# Patient Record
Sex: FEMALE | Employment: FULL TIME | URBAN - METROPOLITAN AREA
[De-identification: names, ages, dates, MRNs, and addresses within clinical notes are randomized per-mention and may not be internally consistent; named-entity substitution may affect disease eponyms.]

---

## 2017-03-14 DIAGNOSIS — K21.00 GASTROESOPHAGEAL REFLUX DISEASE WITH ESOPHAGITIS: Primary | ICD-10-CM

## 2017-03-14 DIAGNOSIS — R10.13 ABDOMINAL PAIN, EPIGASTRIC: ICD-10-CM

## 2017-03-14 NOTE — TELEPHONE ENCOUNTER
LANsoprazole (PREVACID) 30 MG CR capsule      Last Written Prescription Date: 12/13/16  Last Fill Quantity: 60,  # refills: 3   Last Office Visit with [facility], [facility] or [facility] prescribing provider: 12/06/16      [facility]

## 2017-03-17 RX ORDER — LANSOPRAZOLE 30 MG/1
30 CAPSULE, DELAYED RELEASE ORAL 2 TIMES DAILY
Qty: 60 CAPSULE | Refills: 3 | Status: SHIPPED | OUTPATIENT
Start: 2017-03-17 | End: 2017-12-22

## 2017-03-17 NOTE — TELEPHONE ENCOUNTER
Routing refill request to provider for review/approval because:  Drug not on the [facility] refill protocol with associated diagnosis  Drea Olvera RN

## 2017-12-05 ENCOUNTER — TELEPHONE (OUTPATIENT)
Dept: FAMILY MEDICINE | Facility: CLINIC | Age: 42
End: 2017-12-05

## 2017-12-05 NOTE — TELEPHONE ENCOUNTER
Panel Management Review        Patient is due/failing the following:   PAP and PHYSICAL    Action needed:   Patient needs office visit for Physical.    Type of outreach:    I called pt and made an appt for physical.    Questions for provider review:    None  ANMOL Jane

## 2017-12-22 ENCOUNTER — OFFICE VISIT (OUTPATIENT)
Dept: FAMILY MEDICINE | Facility: CLINIC | Age: 42
End: 2017-12-22
Payer: COMMERCIAL

## 2017-12-22 VITALS
BODY MASS INDEX: 34.07 KG/M2 | SYSTOLIC BLOOD PRESSURE: 134 MMHG | HEART RATE: 87 BPM | TEMPERATURE: 97.3 F | WEIGHT: 212 LBS | DIASTOLIC BLOOD PRESSURE: 80 MMHG | OXYGEN SATURATION: 100 % | HEIGHT: 66 IN

## 2017-12-22 DIAGNOSIS — E55.9 VITAMIN D INSUFFICIENCY: ICD-10-CM

## 2017-12-22 DIAGNOSIS — E05.90 HYPERTHYROIDISM: ICD-10-CM

## 2017-12-22 DIAGNOSIS — Z12.39 BREAST CANCER SCREENING: ICD-10-CM

## 2017-12-22 DIAGNOSIS — Z00.00 ROUTINE GENERAL MEDICAL EXAMINATION AT A HEALTH CARE FACILITY: Primary | ICD-10-CM

## 2017-12-22 DIAGNOSIS — Z12.4 SCREENING FOR MALIGNANT NEOPLASM OF CERVIX: ICD-10-CM

## 2017-12-22 LAB
CHOLEST SERPL-MCNC: 172 MG/DL
DEPRECATED CALCIDIOL+CALCIFEROL SERPL-MC: 18 UG/L (ref 20–75)
GLUCOSE SERPL-MCNC: 108 MG/DL (ref 70–99)
HDLC SERPL-MCNC: 74 MG/DL
LDLC SERPL CALC-MCNC: 79 MG/DL
NONHDLC SERPL-MCNC: 98 MG/DL
T4 FREE SERPL-MCNC: 1.23 NG/DL (ref 0.76–1.46)
TRIGL SERPL-MCNC: 95 MG/DL
TSH SERPL DL<=0.005 MIU/L-ACNC: 0.27 MU/L (ref 0.4–4)

## 2017-12-22 PROCEDURE — 87624 HPV HI-RISK TYP POOLED RSLT: CPT | Performed by: PREVENTIVE MEDICINE

## 2017-12-22 PROCEDURE — 80061 LIPID PANEL: CPT | Performed by: PREVENTIVE MEDICINE

## 2017-12-22 PROCEDURE — 36415 COLL VENOUS BLD VENIPUNCTURE: CPT | Performed by: PREVENTIVE MEDICINE

## 2017-12-22 PROCEDURE — 84439 ASSAY OF FREE THYROXINE: CPT | Performed by: PREVENTIVE MEDICINE

## 2017-12-22 PROCEDURE — 99396 PREV VISIT EST AGE 40-64: CPT | Performed by: PREVENTIVE MEDICINE

## 2017-12-22 PROCEDURE — G0145 SCR C/V CYTO,THINLAYER,RESCR: HCPCS | Performed by: PREVENTIVE MEDICINE

## 2017-12-22 PROCEDURE — 84443 ASSAY THYROID STIM HORMONE: CPT | Performed by: PREVENTIVE MEDICINE

## 2017-12-22 PROCEDURE — 82947 ASSAY GLUCOSE BLOOD QUANT: CPT | Performed by: PREVENTIVE MEDICINE

## 2017-12-22 PROCEDURE — 82306 VITAMIN D 25 HYDROXY: CPT | Performed by: PREVENTIVE MEDICINE

## 2017-12-22 ASSESSMENT — PAIN SCALES - GENERAL: PAINLEVEL: NO PAIN (0)

## 2017-12-22 NOTE — PATIENT INSTRUCTIONS
At [facility], we strive to deliver an exceptional experience to you, every time we see you.  If you receive a survey in the mail, please send us back your thoughts. We really do value your feedback.    Based on your medical history, these are the current health maintenance/preventive care services that you are due for (some may have been done at this visit.)  Health Maintenance Due   Topic Date Due     INFLUENZA VACCINE (SYSTEM ASSIGNED)  09/01/2017     PAP Q3 YR  10/27/2017         Suggested websites for health information:  Www.Moondo.BEKIZ : Up to date and easily searchable information on multiple topics.  Www.LumiThera.gov : medication info, interactive tutorials, watch real surgeries online  Www.familydoctor.org : good info from the Academy of Family Physicians  Www.cdc.gov : public health info, travel advisories, epidemics (H1N1)  Www.aap.org : children's health info, normal development, vaccinations  Www.health.[facility].us : [place] dept of health, public health issues in [place], N1N1    Your care team:                            Family Medicine Internal Medicine   MD Javon Lombardo MD Shantel Branch-Fleming, MD Katya Georgiev PA-C Nam Ho, MD Pediatrics   Carl Venegas PABETH Chan, KHOA ULLOA CNP   MD Ashley Reyes MD Deborah Mielke, MD Kim Thein, APRN [facility]      Clinic hours: Monday - Thursday 7 am-7 pm; Fridays 7 am-5 pm.   Urgent care: Monday - Friday 11 am-9 pm; Saturday and Sunday 9 am-5 pm.  Pharmacy : Monday -Thursday 8 am-8 pm; Friday 8 am-6 pm; Saturday and Sunday 9 am-5 pm.     Clinic: [phone]   Pharmacy: [phone]      Preventive Health Recommendations  Female Ages 40 to 49    Yearly exam:     See your health care provider every year in order to  1. Review health changes.   2. Discuss preventive care.    3. Review your medicines if your doctor prescribed any.      Get a Pap test every three years (unless  you have an abnormal result and your provider advises testing more often).      If you get Pap tests with HPV test, you only need to test every 5 years, unless you have an abnormal result. You do not need a Pap test if your uterus was removed (hysterectomy) and you have not had cancer.      You should be tested each year for STDs (sexually transmitted diseases), if you're at risk.       Ask your doctor if you should have a mammogram.      Have a colonoscopy (test for colon cancer) if someone in your family has had colon cancer or polyps before age 50.       Have a cholesterol test every 5 years.       Have a diabetes test (fasting glucose) after age 45. If you are at risk for diabetes, you should have this test every 3 years.    Shots: Get a flu shot each year. Get a tetanus shot every 10 years.     Nutrition:     Eat at least 5 servings of fruits and vegetables each day.    Eat whole-grain bread, whole-wheat pasta and brown rice instead of white grains and rice.    Talk to your provider about Calcium and Vitamin D.     Lifestyle    Exercise at least 150 minutes a week (an average of 30 minutes a day, 5 days a week). This will help you control your weight and prevent disease.    Limit alcohol to one drink per day.    No smoking.     Wear sunscreen to prevent skin cancer.    See your dentist every six months for an exam and cleaning.

## 2017-12-22 NOTE — MR AVS SNAPSHOT
After Visit Summary   12/22/2017    Stuart Daily    MRN: [id number]           Patient Information     Date Of Birth          1975        Visit Information        Provider Department      12/22/2017 7:00 AM Edith Fisher MD [facility]        Today's Diagnoses     Routine general medical examination at a health care facility    -  1    Screening for malignant neoplasm of cervix        Hyperthyroidism        Vitamin D insufficiency        Breast cancer screening          Care Instructions    At [facility], we strive to deliver an exceptional experience to you, every time we see you.  If you receive a survey in the mail, please send us back your thoughts. We really do value your feedback.    Based on your medical history, these are the current health maintenance/preventive care services that you are due for (some may have been done at this visit.)  Health Maintenance Due   Topic Date Due     INFLUENZA VACCINE (SYSTEM ASSIGNED)  09/01/2017     PAP Q3 YR  10/27/2017         Suggested websites for health information:  Www.Dibsie.Evolv : Up to date and easily searchable information on multiple topics.  Www.medlineplus.gov : medication info, interactive tutorials, watch real surgeries online  Www.familydoctor.org : good info from the Academy of Family Physicians  Www.cdc.gov : public health info, travel advisories, epidemics (H1N1)  Www.aap.org : children's health info, normal development, vaccinations  Www.health.state.[place].us : [place] dept of health, public health issues in [place], N1N1    Your care team:                            Family Medicine Internal Medicine   MD Javon Lombardo MD Shantel Branch-Fleming, MD Katya Georgiev PA-C Nam Ho, MD Pediatrics   MUMTAZ Felix, KHOA Garcia APRN CNP   MD Ashley Reyes MD Deborah Mielke, MD Kim Thein, APRN CNP      Clinic hours: Monday - Thursday 7  am-7 pm; Fridays 7 am-5 pm.   Urgent care: Monday - Friday 11 am-9 pm; Saturday and Sunday 9 am-5 pm.  Pharmacy : Monday -Thursday 8 am-8 pm; Friday 8 am-6 pm; Saturday and Sunday 9 am-5 pm.     Clinic: [phone]   Pharmacy: [phone]      Preventive Health Recommendations  Female Ages 40 to 49    Yearly exam:     See your health care provider every year in order to  1. Review health changes.   2. Discuss preventive care.    3. Review your medicines if your doctor prescribed any.      Get a Pap test every three years (unless you have an abnormal result and your provider advises testing more often).      If you get Pap tests with HPV test, you only need to test every 5 years, unless you have an abnormal result. You do not need a Pap test if your uterus was removed (hysterectomy) and you have not had cancer.      You should be tested each year for STDs (sexually transmitted diseases), if you're at risk.       Ask your doctor if you should have a mammogram.      Have a colonoscopy (test for colon cancer) if someone in your family has had colon cancer or polyps before age 50.       Have a cholesterol test every 5 years.       Have a diabetes test (fasting glucose) after age 45. If you are at risk for diabetes, you should have this test every 3 years.    Shots: Get a flu shot each year. Get a tetanus shot every 10 years.     Nutrition:     Eat at least 5 servings of fruits and vegetables each day.    Eat whole-grain bread, whole-wheat pasta and brown rice instead of white grains and rice.    Talk to your provider about Calcium and Vitamin D.     Lifestyle    Exercise at least 150 minutes a week (an average of 30 minutes a day, 5 days a week). This will help you control your weight and prevent disease.    Limit alcohol to one drink per day.    No smoking.     Wear sunscreen to prevent skin cancer.    See your dentist every six months for an exam and cleaning.          Follow-ups after your visit        Future  "tests that were ordered for you today     Open Future Orders        Priority Expected Expires Ordered    *MA Screening Digital Bilateral Routine  2018            Who to contact     If you have questions or need follow up information about today's clinic visit or your schedule please contact [facility] directly at [phone].  Normal or non-critical lab and imaging results will be communicated to you by MyChart, letter or phone within 4 business days after the clinic has received the results. If you do not hear from us within 7 days, please contact the clinic through [employer] or phone. If you have a critical or abnormal lab result, we will notify you by phone as soon as possible.  Submit refill requests through [employer] or call your pharmacy and they will forward the refill request to us. Please allow 3 business days for your refill to be completed.          Additional Information About Your Visit        [employer] Information     [employer] lets you send messages to your doctor, view your test results, renew your prescriptions, schedule appointments and more. To sign up, go to [email or web address] . Click on \"Log in\" on the left side of the screen, which will take you to the Welcome page. Then click on \"Sign up Now\" on the right side of the page.     You will be asked to enter the access code listed below, as well as some personal information. Please follow the directions to create your username and password.     Your access code is: [email or web address]  Expires: 3/13/2018 10:35 AM     Your access code will  in 90 days. If you need help or a new code, please call your [facility] clinic or [phone].        Care EveryWhere ID     This is your Care EveryWhere ID. This could be used by other organizations to access your [facility] medical records  [id number]        Your Vitals Were     Pulse Temperature Height Last Period Pulse Oximetry Breastfeeding?    87 97.3  F (36.3  C) 5' 6\" " (1.676 m) (LMP Unknown) 100% No    BMI (Body Mass Index)                   34.22 kg/m2            Blood Pressure from Last 3 Encounters:   12/22/17 134/80   12/06/16 127/88   12/03/15 125/89    Weight from Last 3 Encounters:   12/22/17 212 lb (96.2 kg)   12/06/16 205 lb 1.6 oz (93 kg)   12/03/15 206 lb (93.4 kg)              We Performed the Following     Glucose     HPV High Risk Types DNA Cervical     Lipid panel reflex to direct LDL Fasting     Pap imaged thin layer screen with HPV - recommended age 30 - 65 years (select HPV order below)     T4 free     TSH     Vitamin D Deficiency          Today's Medication Changes          These changes are accurate as of: 12/22/17  7:29 AM.  If you have any questions, ask your nurse or doctor.               Stop taking these medicines if you haven't already. Please contact your care team if you have questions.     amoxicillin 500 MG capsule   Commonly known as:  AMOXIL           LANsoprazole 30 MG CR capsule   Commonly known as:  PREVACID           nitroFURantoin (macrocrystal-monohydrate) 100 MG capsule   Commonly known as:  MACROBID                    Primary Care Provider Office Phone # Fax #    Gabriel Spangler [phone]       [address] [place] [address]        Equal Access to Services     EILEEN LOCKHART AH: Hadii aad ku hadasho Soomaali, waaxda luqadaha, qaybta kaalmada adeegyada, waxay mi haymendel viera. So [facility] [phone].    ATENCIÓN: Si habla español, tiene a fontenot disposición servicios gratuitos de asistencia lingüística. Llame al [phone].    We comply with applicable federal civil rights laws and [place] laws. We do not discriminate on the basis of race, color, national origin, age, disability, sex, sexual orientation, or gender identity.            Thank you!     Thank you for choosing [facility]  for your care. Our goal is always to provide you with excellent care. Hearing back from our  patients is one way we can continue to improve our services. Please take a few minutes to complete the written survey that you may receive in the mail after your visit with us. Thank you!             Your Updated Medication List - Protect others around you: Learn how to safely use, store and throw away your medicines at www.disposemymeds.org.      Notice  As of 12/22/2017  7:29 AM    You have not been prescribed any medications.

## 2017-12-22 NOTE — LETTER
December 26, 2017      Stuart Daily  [address] [place] [address]              Dear Stuart,    Cholesterol is at goal for you.   Glucose is in the impaired range, this means you do not have diabetes but are at an increased risk of developing diabetes.     Here are some ways to improve your glucose without medication:     Try to get at least 45 minutes of aerobic exercise 5-6 days a week   Maintain a healthy body weight   Eat less saturated fats   Buy lean cuts of meat, reduce your portions of red meat or substitute poultry or fish   Avoid fried or fast foods that are high in fat   Eat more fruits and vegetables     Thyroid function is improved from last check but we should recheck this in 6 months.     Your vitamin D level was low at 18, should be over 30. Maintaining adequate levels is very important for good health. Low levels can cause fatigue and joint pains. I recommend starting over the counter Vitamin D 3 in a dose of 5000 units daily for 3 months. We will recheck labs in 3 months. Please call if you have any questions.     Regards,     Edith Fisher MD MPH/ag                        Results for orders placed or performed in visit on 12/22/17   Glucose   Result Value Ref Range    Glucose 108 (H) 70 - 99 mg/dL   Lipid panel reflex to direct LDL Fasting   Result Value Ref Range    Cholesterol 172 <200 mg/dL    Triglycerides 95 <150 mg/dL    HDL Cholesterol 74 >49 mg/dL    LDL Cholesterol Calculated 79 <100 mg/dL    Non HDL Cholesterol 98 <130 mg/dL   Vitamin D Deficiency   Result Value Ref Range    Vitamin D Deficiency screening 18 (L) 20 - 75 ug/L   T4 free   Result Value Ref Range    T4 Free 1.23 0.76 - 1.46 ng/dL   TSH   Result Value Ref Range    TSH 0.27 (L) 0.40 - 4.00 mU/L

## 2017-12-22 NOTE — PROGRESS NOTES
SUBJECTIVE:   CC: Aunelza Daily is an 42 year old woman who presents for preventive health visit.     Healthy Habits:    Do you get at least three servings of calcium containing foods daily (dairy, green leafy vegetables, etc.)? yes    Amount of exercise or daily activities, outside of work: none    Problems taking medications regularly not applicable    Medication side effects: No    Have you had an eye exam in the past two years? no    Do you see a dentist twice per year? no    Do you have sleep apnea, excessive snoring or daytime drowsiness?no      Today's PHQ-2 Score: PHQ-2 ( 1999 Pfizer) 12/22/2017 12/6/2016   Q1: Little interest or pleasure in doing things 0 0   Q2: Feeling down, depressed or hopeless 0 0   PHQ-2 Score 0 0         Abuse: Current or Past(Physical, Sexual or Emotional)- No  Do you feel safe in your environment - Yes  Social History   Substance Use Topics     Smoking status: Never Smoker     Smokeless tobacco: Never Used     Alcohol use No     If you drink alcohol do you typically have >3 drinks per day or >7 drinks per week? Not Applicable                     Reviewed orders with patient.  Reviewed health maintenance and updated orders accordingly - Yes  Labs reviewed in EPIC  BP Readings from Last 3 Encounters:   12/22/17 134/80   12/06/16 127/88   12/03/15 125/89    Wt Readings from Last 3 Encounters:   12/22/17 212 lb (96.2 kg)   12/06/16 205 lb 1.6 oz (93 kg)   12/03/15 206 lb (93.4 kg)                  Patient Active Problem List   Diagnosis     Hyperthyroidism     Vitamin D insufficiency     Obesity (BMI 30.0-34.9)     H. pylori infection     Past Surgical History:   Procedure Laterality Date     NO HISTORY OF SURGERY         Social History   Substance Use Topics     Smoking status: Never Smoker     Smokeless tobacco: Never Used     Alcohol use No     Family History   Problem Relation Age of Onset     Family History Negative No family hx of      CANCER No family hx of      Thyroid  Disease No family hx of      Glaucoma No family hx of      Macular Degeneration No family hx of      Asthma No family hx of      C.A.D. No family hx of      Endocrine Disease No family hx of      HEART DISEASE No family hx of      Psychotic Disorder No family hx of      Blood Disease No family hx of      GASTROINTESTINAL DISEASE No family hx of      DIABETES Mother      Hypertension Mother      Lipids Mother      CEREBROVASCULAR DISEASE Maternal Grandmother      Neurologic Disorder Father      migraine         No current outpatient prescriptions on file.     No Known Allergies        Patient under age 50, mutual decision reflected in health maintenance.        Pertinent mammograms are reviewed under the imaging tab.  History of abnormal Pap smear: NO - age 30-65 PAP every 5 years with negative HPV co-testing recommended    [facility]  [facility]         Reviewed and updated as needed this visit by Provider        Past Medical History:   Diagnosis Date     Hyperthyroidism 2012    mild Graves disease? Endocrinology-no tx      Past Surgical History:   Procedure Laterality Date     NO HISTORY OF SURGERY         ROS:  C: NEGATIVE for fever, chills, change in weight  I: NEGATIVE for worrisome rashes, moles or lesions  E: NEGATIVE for vision changes or irritation  ENT: NEGATIVE for ear, mouth and throat problems  R: NEGATIVE for significant cough or SOB  B: NEGATIVE for masses, tenderness or discharge  CV: NEGATIVE for chest pain, palpitations or peripheral edema  GI: NEGATIVE for nausea, abdominal pain, heartburn, or change in bowel habits  : NEGATIVE for unusual urinary or vaginal symptoms. Periods are regular.  M: NEGATIVE for significant arthralgias or myalgia  N: NEGATIVE for weakness, dizziness or paresthesias  E: NEGATIVE for temperature intolerance, skin/hair changes  H: NEGATIVE for bleeding problems  P: NEGATIVE for changes in mood or affect    OBJECTIVE:  "  /80  Pulse 87  Temp 97.3  F (36.3  C)  Ht 5' 6\" (1.676 m)  Wt 212 lb (96.2 kg)  LMP  (LMP Unknown)  SpO2 100%  Breastfeeding? No  BMI 34.22 kg/m2  EXAM:  GENERAL: healthy, alert and no distress  EYES: Eyes grossly normal to inspection, PERRL and conjunctivae and sclerae normal  HENT: ear canals and TM's normal, nose and mouth without ulcers or lesions  NECK: no adenopathy, no asymmetry, masses  RESP: lungs clear to auscultation - no rales, rhonchi or wheezes  BREAST: normal without masses, tenderness or nipple discharge and no palpable axillary masses or adenopathy  CV: regular rate and rhythm, normal S1 S2, no S3 or S4, no murmur, click or rub, no peripheral edema and peripheral pulses strong  ABDOMEN: soft, nontender, no hepatosplenomegaly, no masses    (female): normal female external genitalia, normal urethral meatus, vaginal mucosa pink, moist, well rugated, and normal cervix/adnexa/uterus without masses or discharge  MS: no gross musculoskeletal defects noted, no edema  SKIN: no suspicious lesions or rashes  NEURO: Normal strength and tone, mentation intact and speech normal  PSYCH: mentation appears normal, affect normal/bright  LYMPH: no cervical, supraclavicular, axillary adenopathy    ASSESSMENT/PLAN:   Stuart was seen today for physical.    Diagnoses and all orders for this visit:    Routine general medical examination at a health care facility  -     Glucose  -     Lipid panel reflex to direct LDL Fasting    Screening for malignant neoplasm of cervix  -     Pap imaged thin layer screen with HPV - recommended age 30 - 65 years (select HPV order below)  -     HPV High Risk Types DNA Cervical  -Screening guidelines reviewed     Hyperthyroidism  -Subclinical hyperthyroidism  -Was seen by ENDO   -Due for labs  -Not on medication at this time     Vitamin D insufficiency  -     Vitamin D Deficiency  -Last levels were 18    Breast cancer screening  -     *MA Screening Digital Bilateral; " "Future        COUNSELING:   Reviewed preventive health counseling, as reflected in patient instructions       Regular exercise       Healthy diet/nutrition    BP Screening:   Last 3 BP Readings:    BP Readings from Last 3 Encounters:   12/22/17 134/80   12/06/16 127/88   12/03/15 125/89       The following was recommended to the patient:  Re-screen BP within a year and recommended lifestyle modifications     reports that she has never smoked. She has never used smokeless tobacco.    Estimated body mass index is 34.22 kg/(m^2) as calculated from the following:    Height as of this encounter: 5' 6\" (1.676 m).    Weight as of this encounter: 212 lb (96.2 kg).   Weight management plan: Discussed healthy diet and exercise guidelines and patient will follow up in 12 months in clinic to re-evaluate.    Counseling Resources:  ATP IV Guidelines  Pooled Cohorts Equation Calculator  Breast Cancer Risk Calculator  FRAX Risk Assessment  ICSI Preventive Guidelines  Dietary Guidelines for Americans, 2010  USDA's MyPlate  ASA Prophylaxis  Lung CA Screening    Edith Fisher MD MPH    [facility]  "

## 2017-12-22 NOTE — LETTER
January 6, 2018    Stuart Daily  [address] [place] [address]    Dear Sturat,  We are happy to inform you that your PAP smear result from 12/22/17 is normal.  We are now able to do a follow up test on PAP smears. The DNA test is for HPV (Human Papilloma Virus). Cervical cancer is closely linked with certain types of HPV. Your result showed no evidence of high risk HPV.  Therefore we recommend you return in 5 years for your next pap smear and HPV test.  You will still need to return to the clinic every year for an annual exam and other preventive tests.  Please contact the clinic at [phone] with any questions.  Sincerely,    Edith Fisher MD/crystal

## 2017-12-25 NOTE — PROGRESS NOTES
Please send a letter:    Dear Linwoodelza Daily    Cholesterol is at goal for you.  Glucose is in the impaired range, this means you do not have diabetes but are at an increased risk of developing diabetes.    Here are some ways to improve your glucose without medication:    Try to get at least 45 minutes of aerobic exercise 5-6 days a week  Maintain a healthy body weight  Eat less saturated fats  Buy lean cuts of meat, reduce your portions of red meat or substitute poultry or fish  Avoid fried or fast foods that are high in fat  Eat more fruits and vegetables    Thyroid function is improved from last check but we should recheck this in 6 months.    Your vitamin D level was low at 18, should be over 30. Maintaining adequate levels is very important for good health. Low levels can cause fatigue and joint pains. I recommend starting over the counter Vitamin D 3 in a dose of 5000 units daily for 3 months. We will recheck labs in 3 months. Please call if you have any questions.     Regards,    Edith Fisher MD MPH

## 2017-12-28 LAB
COPATH REPORT: NORMAL
PAP: NORMAL

## 2018-01-02 LAB
FINAL DIAGNOSIS: NORMAL
HPV HR 12 DNA CVX QL NAA+PROBE: NEGATIVE
HPV16 DNA SPEC QL NAA+PROBE: NEGATIVE
HPV18 DNA SPEC QL NAA+PROBE: NEGATIVE
SPECIMEN DESCRIPTION: NORMAL

## 2018-06-25 ENCOUNTER — RADIANT APPOINTMENT (OUTPATIENT)
Dept: GENERAL RADIOLOGY | Facility: CLINIC | Age: 43
End: 2018-06-25
Attending: FAMILY MEDICINE
Payer: COMMERCIAL

## 2018-06-25 ENCOUNTER — OFFICE VISIT (OUTPATIENT)
Dept: URGENT CARE | Facility: URGENT CARE | Age: 43
End: 2018-06-25
Payer: COMMERCIAL

## 2018-06-25 VITALS
OXYGEN SATURATION: 100 % | BODY MASS INDEX: 30.76 KG/M2 | WEIGHT: 190.6 LBS | SYSTOLIC BLOOD PRESSURE: 147 MMHG | DIASTOLIC BLOOD PRESSURE: 80 MMHG | TEMPERATURE: 98.6 F | HEART RATE: 79 BPM

## 2018-06-25 DIAGNOSIS — K52.9 GASTROENTERITIS: ICD-10-CM

## 2018-06-25 DIAGNOSIS — R10.84 ABDOMINAL PAIN, GENERALIZED: ICD-10-CM

## 2018-06-25 DIAGNOSIS — D50.9 IRON DEFICIENCY ANEMIA, UNSPECIFIED IRON DEFICIENCY ANEMIA TYPE: ICD-10-CM

## 2018-06-25 DIAGNOSIS — R10.84 ABDOMINAL PAIN, GENERALIZED: Primary | ICD-10-CM

## 2018-06-25 LAB
ALBUMIN UR-MCNC: NEGATIVE MG/DL
APPEARANCE UR: CLEAR
BASOPHILS # BLD AUTO: 0 10E9/L (ref 0–0.2)
BASOPHILS NFR BLD AUTO: 0.3 %
BETA HCG QUAL IFA URINE: NEGATIVE
BILIRUB UR QL STRIP: NEGATIVE
COLOR UR AUTO: YELLOW
DIFFERENTIAL METHOD BLD: ABNORMAL
EOSINOPHIL # BLD AUTO: 0.1 10E9/L (ref 0–0.7)
EOSINOPHIL NFR BLD AUTO: 0.7 %
ERYTHROCYTE [DISTWIDTH] IN BLOOD BY AUTOMATED COUNT: 18.8 % (ref 10–15)
ERYTHROCYTE [SEDIMENTATION RATE] IN BLOOD BY WESTERGREN METHOD: 21 MM/H (ref 0–20)
GLUCOSE UR STRIP-MCNC: NEGATIVE MG/DL
HCT VFR BLD AUTO: 26.7 % (ref 35–47)
HGB BLD-MCNC: 8.2 G/DL (ref 11.7–15.7)
HGB UR QL STRIP: NEGATIVE
KETONES UR STRIP-MCNC: ABNORMAL MG/DL
LEUKOCYTE ESTERASE UR QL STRIP: NEGATIVE
LYMPHOCYTES # BLD AUTO: 1.9 10E9/L (ref 0.8–5.3)
LYMPHOCYTES NFR BLD AUTO: 20.9 %
MCH RBC QN AUTO: 19.9 PG (ref 26.5–33)
MCHC RBC AUTO-ENTMCNC: 30.7 G/DL (ref 31.5–36.5)
MCV RBC AUTO: 65 FL (ref 78–100)
MONOCYTES # BLD AUTO: 0.9 10E9/L (ref 0–1.3)
MONOCYTES NFR BLD AUTO: 10.1 %
NEUTROPHILS # BLD AUTO: 6 10E9/L (ref 1.6–8.3)
NEUTROPHILS NFR BLD AUTO: 68 %
NITRATE UR QL: NEGATIVE
PH UR STRIP: 5.5 PH (ref 5–7)
PLATELET # BLD AUTO: 350 10E9/L (ref 150–450)
RBC # BLD AUTO: 4.12 10E12/L (ref 3.8–5.2)
SOURCE: ABNORMAL
SP GR UR STRIP: >1.03 (ref 1–1.03)
UROBILINOGEN UR STRIP-ACNC: 0.2 EU/DL (ref 0.2–1)
WBC # BLD AUTO: 8.8 10E9/L (ref 4–11)

## 2018-06-25 PROCEDURE — 81003 URINALYSIS AUTO W/O SCOPE: CPT | Performed by: FAMILY MEDICINE

## 2018-06-25 PROCEDURE — 99214 OFFICE O/P EST MOD 30 MIN: CPT | Performed by: FAMILY MEDICINE

## 2018-06-25 PROCEDURE — 86140 C-REACTIVE PROTEIN: CPT | Performed by: FAMILY MEDICINE

## 2018-06-25 PROCEDURE — 74019 RADEX ABDOMEN 2 VIEWS: CPT | Mod: FY

## 2018-06-25 PROCEDURE — 80053 COMPREHEN METABOLIC PANEL: CPT | Performed by: FAMILY MEDICINE

## 2018-06-25 PROCEDURE — 83690 ASSAY OF LIPASE: CPT | Performed by: FAMILY MEDICINE

## 2018-06-25 PROCEDURE — 85652 RBC SED RATE AUTOMATED: CPT | Performed by: FAMILY MEDICINE

## 2018-06-25 PROCEDURE — 84703 CHORIONIC GONADOTROPIN ASSAY: CPT | Performed by: FAMILY MEDICINE

## 2018-06-25 PROCEDURE — 85025 COMPLETE CBC W/AUTO DIFF WBC: CPT | Performed by: FAMILY MEDICINE

## 2018-06-25 PROCEDURE — 36415 COLL VENOUS BLD VENIPUNCTURE: CPT | Performed by: FAMILY MEDICINE

## 2018-06-25 RX ORDER — ONDANSETRON 4 MG/1
4-8 TABLET, ORALLY DISINTEGRATING ORAL
Qty: 20 TABLET | Refills: 1 | Status: SHIPPED | OUTPATIENT
Start: 2018-06-25 | End: 2018-07-13

## 2018-06-25 NOTE — MR AVS SNAPSHOT
"              After Visit Summary   6/25/2018    Stuart Daily    MRN: [id number]           Patient Information     Date Of Birth          1975        Visit Information        Provider Department      6/25/2018 7:10 PM Marily Dean MD [facility]        Today's Diagnoses     Abdominal pain, generalized    -  1    Iron deficiency anemia, unspecified iron deficiency anemia type          Care Instructions       * FOOD POISONING or VIRAL GASTROENTERITIS (6yr-Adult)  FOOD POISONING may occur from 6 to 24 hours after eating food that has spoiled and lasts up to1-2 days. VIRAL GASTRO-ENTERITIS is commonly known as the \"stomach flu\" and may last 2-7 days. Symptoms of both illnesses may include vomiting, diarrhea, fever, abdominal cramping. Antibiotics are not effective, but simple home treatment will be helpful.  HOME CARE:      If symptoms are severe, rest at home for the next 24 hours.    Avoid tobacco and alcohol. These may worsen your symptoms.    If medicines for diarrhea (low dose of Immodium: one tablet a day for an adult) or vomiting were prescribed, take only as directed.   During the first 12 to 24 hours follow the diet below:    DRINKS: Sport drinks like Gatorade, soft drinks without caffeine; ginger ale, mineral water (plain or flavored), decaffeinated tea and coffee.    SOUPS: Clear broth, consommé and bouillon    DESSERTS: Plain gelatin (Jell-O), popsicles and fruit juice bars.  During the next 24 hours you may add the following to the above:    Hot cereal, plain toast, bread, rolls, crackers    Plain noodles, rice, mashed potatoes, chicken noodle or rice soup    Unsweetened canned fruit (avoid pineapple), bananas    Limit fat intake to less than 15 grams per day by avoiding margarine, butter, oils, mayonnaise, sauces, gravies, fried foods, peanut butter, meat, poultry and fish.    Limit fiber; avoid raw or cooked vegetables, fresh fruits (except bananas) and bran " cereals.    Limit caffeine and chocolate. No spices or seasonings except salt.  Slowly go back to a normal diet as you feel better and your symptoms lessen.  FOLLOW UP with your doctor as advised if you are not better in 2 days. If a stool (diarrhea) sample was taken, you may call in 2 days (or as directed) for the results.  GET PROMPT MEDICAL ATTENTION if any of the following occur:    Increasing abdominal pain or constant pain in one spot    Continued vomiting (unable to keep liquids down)    Frequent diarrhea (more than 5 times a day)    Blood in vomit or stool (black or red color)    Unable to take in fluids at all    No urine output for 12 hours or extreme thirst    Weakness, dizziness, fainting    Drowsiness, confusion, stiff neck or seizure    Fever over 101.0  F (38.3  C) for more than 3 days    New rash    9714-1677 [employer]. [address] [place] [address]. All rights reserved. This information is not intended as a substitute for professional medical care. Always follow your healthcare professional's instructions.  This information has been modified by your health care provider with permission from the publisher.    Iron-Deficiency Anemia (Adult)  Red blood cells carry oxygen to the tissues of your body. Anemia is a condition in which you have too few red blood cells. You need iron to make red cells. Anemia makes you feel tired and run down. When anemia becomes severe, your skin becomes pale. You may feel short of breath after physical activity. Other symptoms include:    Headaches    Dizziness    Leg cramps with physical activity    Drowsiness    Restless legs  Your anemia is caused by not having enough iron in your body. This may be because of:    Loss of blood. This can be caused by heavy menstrual periods. It can also be caused by bleeding from the stomach or intestines.    Poor diet. You may not be eating enough foods that contain iron.    Inability to absorb iron from the  foods you eat    Pregnancy  If your blood count is low enough, your healthcare provider may prescribe an iron supplement. It usually takes about 2 to 3 months of treatment with iron supplements to correct anemia. Severe cases of anemia need a blood transfusion to quickly ease symptoms and deliver more oxygen to the cells.  Home care  Follow these guidelines when caring for yourself at home:    Eat foods high in iron. This will boost the amount of iron stored in your body. It is a natural way to build up the number of blood cells. Good sources of iron include beef, liver, spinach and other dark green leafy vegetables, whole grains, beans, and nuts.    Do not overexert yourself.    Talk with your healthcare provider before traveling by air or traveling to high altitudes.  Follow-up care  Follow up with your healthcare provider in 2 months, or as advised. This is to have another red blood cell count to be sure your anemia has been fixed.  When to seek medical advice  Call your healthcare provider right away if any of these occur:    Shortness of breath or chest pain    Dizziness or fainting    Vomiting blood or passing red or black-colored stool   Date Last Reviewed: 2/25/2016 2000-2017 [employer]. [address] [place] [address]. All rights reserved. This information is not intended as a substitute for professional medical care. Always follow your healthcare professional's instructions.                Follow-ups after your visit        Future tests that were ordered for you today     Open Future Orders        Priority Expected Expires Ordered    Occult blood fecal HGB immuno Routine  6/25/2019 6/25/2018            Who to contact     If you have questions or need follow up information about today's clinic visit or your schedule please contact [facility] directly at [phone].  Normal or non-critical lab and imaging results will be communicated to you by MyChart, letter  "or phone within 4 business days after the clinic has received the results. If you do not hear from us within 7 days, please contact the clinic through [email or web address] or phone. If you have a critical or abnormal lab result, we will notify you by phone as soon as possible.  Submit refill requests through [email or web address] or call your pharmacy and they will forward the refill request to us. Please allow 3 business days for your refill to be completed.          Additional Information About Your Visit        [email or web address] Information     [email or web address] lets you send messages to your doctor, view your test results, renew your prescriptions, schedule appointments and more. To sign up, go to [email or web address] . Click on \"Log in\" on the left side of the screen, which will take you to the Welcome page. Then click on \"Sign up Now\" on the right side of the page.     You will be asked to enter the access code listed below, as well as some personal information. Please follow the directions to create your username and password.     Your access code is: [email or web address]  Expires: 2018  9:24 PM     Your access code will  in 90 days. If you need help or a new code, please call your [facility] clinic or [phone].        Care EveryWhere ID     This is your Care EveryWhere ID. This could be used by other organizations to access your [facility] medical records  [id number]        Your Vitals Were     Pulse Temperature Pulse Oximetry BMI (Body Mass Index)          79 98.6  F (37  C) (Oral) 100% 30.76 kg/m2         Blood Pressure from Last 3 Encounters:   18 147/80   17 134/80   16 127/88    Weight from Last 3 Encounters:   18 190 lb 9.6 oz (86.5 kg)   17 212 lb (96.2 kg)   16 205 lb 1.6 oz (93 kg)              We Performed the Following     Beta HCG qual IFA urine     CBC with platelets differential     Comprehensive metabolic panel     CRP inflammation     Erythrocyte sedimentation rate auto     Lipase     UA reflex " to Microscopic and Culture          Today's Medication Changes          These changes are accurate as of 6/25/18  9:24 PM.  If you have any questions, ask your nurse or doctor.               Start taking these medicines.        Dose/Directions    ondansetron 4 MG ODT tab   Commonly known as:  ZOFRAN ODT   Used for:  Abdominal pain, generalized   Started by:  Marily Dean MD        Dose:  4-8 mg   Take 1-2 tablets (4-8 mg) by mouth 3 times daily (before meals)   Quantity:  20 tablet   Refills:  1            Where to get your medicines      Some of these will need a paper prescription and others can be bought over the counter.  Ask your nurse if you have questions.     Bring a paper prescription for each of these medications     ondansetron 4 MG ODT tab                Primary Care Provider Office Phone # Fax #    Gabriel Spangler [phone] [address] [facility] [address]        Equal Access to Services     [facility]: Hadii roger liriano hadasho Soomaali, waaxda luqadaha, qaybta kaalmada adeegyada, waxay waldemarin haymendel campbell . So [facility] [phone].    ATENCIÓN: Si habla español, tiene a fontenot disposición servicios gratuitos de asistencia lingüística. Luis al [phone].    We comply with applicable federal civil rights laws and [place] laws. We do not discriminate on the basis of race, color, national origin, age, disability, sex, sexual orientation, or gender identity.            Thank you!     Thank you for choosing [facility]  for your care. Our goal is always to provide you with excellent care. Hearing back from our patients is one way we can continue to improve our services. Please take a few minutes to complete the written survey that you may receive in the mail after your visit with us. Thank you!             Your Updated Medication List - Protect others around you: Learn how to safely use, store and throw away your medicines at  www.disposemymeds.org.          This list is accurate as of 6/25/18  9:24 PM.  Always use your most recent med list.                   Brand Name Dispense Instructions for use Diagnosis    ondansetron 4 MG ODT tab    ZOFRAN ODT    20 tablet    Take 1-2 tablets (4-8 mg) by mouth 3 times daily (before meals)    Abdominal pain, generalized

## 2018-06-25 NOTE — LETTER
[facility]  [address]  [place] [address]  Phone: [phone]    06/26/18    Stuart Daily  [address]  [place] [address]      To whom it may concern:     The results of your recent lab results were normal. Enclosed are a copy of the results. Please call us with any questions/concerns regarding your results. Thank you for choosing [facility]. Have a great day!  Results for orders placed or performed in visit on 06/25/18   Comprehensive metabolic panel   Result Value Ref Range    Sodium 139 133 - 144 mmol/L    Potassium 3.5 3.4 - 5.3 mmol/L    Chloride 104 94 - 109 mmol/L    Carbon Dioxide 25 20 - 32 mmol/L    Anion Gap 10 3 - 14 mmol/L    Glucose 85 70 - 99 mg/dL    Urea Nitrogen 16 7 - 30 mg/dL    Creatinine 0.64 0.52 - 1.04 mg/dL    GFR Estimate >90 >60 mL/min/1.7m2    GFR Estimate If Black >90 >60 mL/min/1.7m2    Calcium 8.8 8.5 - 10.1 mg/dL    Bilirubin Total 0.3 0.2 - 1.3 mg/dL    Albumin 3.8 3.4 - 5.0 g/dL    Protein Total 7.7 6.8 - 8.8 g/dL    Alkaline Phosphatase 50 40 - 150 U/L    ALT 24 0 - 50 U/L    AST 15 0 - 45 U/L   Lipase   Result Value Ref Range    Lipase 151 73 - 393 U/L   CBC with platelets differential   Result Value Ref Range    WBC 8.8 4.0 - 11.0 10e9/L    RBC Count 4.12 3.8 - 5.2 10e12/L    Hemoglobin 8.2 (L) 11.7 - 15.7 g/dL    Hematocrit 26.7 (L) 35.0 - 47.0 %    MCV 65 (L) 78 - 100 fl    MCH 19.9 (L) 26.5 - 33.0 pg    MCHC 30.7 (L) 31.5 - 36.5 g/dL    RDW 18.8 (H) 10.0 - 15.0 %    Platelet Count 350 150 - 450 10e9/L    Diff Method Automated Method     % Neutrophils 68.0 %    % Lymphocytes 20.9 %    % Monocytes 10.1 %    % Eosinophils 0.7 %    % Basophils 0.3 %    Absolute Neutrophil 6.0 1.6 - 8.3 10e9/L    Absolute Lymphocytes 1.9 0.8 - 5.3 10e9/L    Absolute Monocytes 0.9 0.0 - 1.3 10e9/L    Absolute Eosinophils 0.1 0.0 - 0.7 10e9/L    Absolute Basophils 0.0 0.0 - 0.2 10e9/L   UA reflex to Microscopic and Culture    Result Value Ref Range    Color Urine Yellow     Appearance Urine Clear     Glucose Urine Negative NEG^Negative mg/dL    Bilirubin Urine Negative NEG^Negative    Ketones Urine Trace (A) NEG^Negative mg/dL    Specific Gravity Urine >1.030 1.003 - 1.035    Blood Urine Negative NEG^Negative    pH Urine 5.5 5.0 - 7.0 pH    Protein Albumin Urine Negative NEG^Negative mg/dL    Urobilinogen Urine 0.2 0.2 - 1.0 EU/dL    Nitrite Urine Negative NEG^Negative    Leukocyte Esterase Urine Negative NEG^Negative    Source Midstream Urine    Erythrocyte sedimentation rate auto   Result Value Ref Range    Sed Rate 21 (H) 0 - 20 mm/h   CRP inflammation   Result Value Ref Range    CRP Inflammation <2.9 0.0 - 8.0 mg/L   Beta HCG qual IFA urine   Result Value Ref Range    Beta HCG Qual IFA Urine Negative NEG^Negative          Your electrolytes, kidney tests, pancreas test, and liver tests were normal.  You crp test (for inflammation) was negative.     Follow up with your primary doctor about your anemia (low hemoglobin) like you were instructed at your visit.       Please contact the clinic if you have additional questions.  Thank you.     Sincerely,     Daksha Mckeon M.D.

## 2018-06-26 ENCOUNTER — OFFICE VISIT (OUTPATIENT)
Dept: FAMILY MEDICINE | Facility: CLINIC | Age: 43
End: 2018-06-26
Payer: COMMERCIAL

## 2018-06-26 VITALS
TEMPERATURE: 98.5 F | SYSTOLIC BLOOD PRESSURE: 123 MMHG | DIASTOLIC BLOOD PRESSURE: 83 MMHG | HEART RATE: 79 BPM | WEIGHT: 190 LBS | BODY MASS INDEX: 30.53 KG/M2 | OXYGEN SATURATION: 99 % | HEIGHT: 66 IN

## 2018-06-26 DIAGNOSIS — E55.9 VITAMIN D INSUFFICIENCY: ICD-10-CM

## 2018-06-26 DIAGNOSIS — D50.0 IRON DEFICIENCY ANEMIA DUE TO CHRONIC BLOOD LOSS: ICD-10-CM

## 2018-06-26 DIAGNOSIS — R10.13 ABDOMINAL PAIN, EPIGASTRIC: Primary | ICD-10-CM

## 2018-06-26 DIAGNOSIS — Z86.19 HISTORY OF HELICOBACTER PYLORI INFECTION: ICD-10-CM

## 2018-06-26 LAB
ALBUMIN SERPL-MCNC: 3.8 G/DL (ref 3.4–5)
ALP SERPL-CCNC: 50 U/L (ref 40–150)
ALT SERPL W P-5'-P-CCNC: 24 U/L (ref 0–50)
ANION GAP SERPL CALCULATED.3IONS-SCNC: 10 MMOL/L (ref 3–14)
AST SERPL W P-5'-P-CCNC: 15 U/L (ref 0–45)
BILIRUB SERPL-MCNC: 0.3 MG/DL (ref 0.2–1.3)
BUN SERPL-MCNC: 16 MG/DL (ref 7–30)
CALCIUM SERPL-MCNC: 8.8 MG/DL (ref 8.5–10.1)
CHLORIDE SERPL-SCNC: 104 MMOL/L (ref 94–109)
CO2 SERPL-SCNC: 25 MMOL/L (ref 20–32)
CREAT SERPL-MCNC: 0.64 MG/DL (ref 0.52–1.04)
CRP SERPL-MCNC: <2.9 MG/L (ref 0–8)
GFR SERPL CREATININE-BSD FRML MDRD: >90 ML/MIN/1.7M2
GLUCOSE SERPL-MCNC: 85 MG/DL (ref 70–99)
LIPASE SERPL-CCNC: 151 U/L (ref 73–393)
POTASSIUM SERPL-SCNC: 3.5 MMOL/L (ref 3.4–5.3)
PROT SERPL-MCNC: 7.7 G/DL (ref 6.8–8.8)
SODIUM SERPL-SCNC: 139 MMOL/L (ref 133–144)

## 2018-06-26 PROCEDURE — 99214 OFFICE O/P EST MOD 30 MIN: CPT | Performed by: PREVENTIVE MEDICINE

## 2018-06-26 RX ORDER — NICOTINE POLACRILEX 4 MG/1
20 GUM, CHEWING ORAL DAILY
Qty: 30 TABLET | Refills: 0 | Status: SHIPPED | OUTPATIENT
Start: 2018-06-26 | End: 2018-07-23

## 2018-06-26 RX ORDER — FERROUS GLUCONATE 324(38)MG
324 TABLET ORAL
Qty: 100 TABLET | Refills: 0 | Status: SHIPPED | OUTPATIENT
Start: 2018-06-26 | End: 2018-10-01

## 2018-06-26 ASSESSMENT — PAIN SCALES - GENERAL: PAINLEVEL: NO PAIN (0)

## 2018-06-26 NOTE — PATIENT INSTRUCTIONS
At [facility], we strive to deliver an exceptional experience to you, every time we see you.  If you receive a survey in the mail, please send us back your thoughts. We really do value your feedback.    Based on your medical history, these are the current health maintenance/preventive care services that you are due for (some may have been done at this visit.)  There are no preventive care reminders to display for this patient.    Suggested websites for health information:  Www.[place].org : Up to date and easily searchable information on multiple topics.  Www.medlineplus.gov : medication info, interactive tutorials, watch real surgeries online  Www.familydoctor.org : good info from the Academy of Family Physicians  Www.cdc.gov : public health info, travel advisories, epidemics (H1N1)  Www.aap.org : children's health info, normal development, vaccinations  Www.health.[facility].[place].us : [place] dept of health, public health issues in [place], N1N1    Your care team:                            Family Medicine Internal Medicine   MD Javon Lombardo MD Shantel Branch-Fleming, MD Katya Georgiev PA-C Megan Hill, APRN CNP    Juaquin Nelson MD Pediatrics   Carl Venegas, PAGilbertoC  Bebe Chan, CNP MD Char Hodgson APRN CNP   MD Ashley Reyes MD Deborah Mielke, MD Kim Thein, APRN CNP      Clinic hours: Monday - Thursday 7 am-7 pm; Fridays 7 am-5 pm.   Urgent care: Monday - Friday 11 am-9 pm; Saturday and Sunday 9 am-5 pm.  Pharmacy : Monday -Thursday 8 am-8 pm; Friday 8 am-6 pm; Saturday and Sunday 9 am-5 pm.     Clinic: [phone]   Pharmacy: [phone]

## 2018-06-26 NOTE — PROGRESS NOTES
SUBJECTIVE:   Aunelza Daily is a 43 year old female who presents to clinic today for the following health issues:      ED/UC Followup:    Facility:  [facility]  Date of visit: 6/25/18  Reason for visit: abdomen pain  Current Status: doing better 0/10 pain         Goes back and forth over a few months.   Seen in 2016 for similar symptoms.  Flares up periodically, recur every 2-3 hours  No fever  Emesis is better,did not even  script for Zofran that was given in UC  H pylori+, treated in 2016  Epigastric+  May go up to breast area  No melena  No rectal bleeding  First 3 days of heavy bleeding during menses, with blood clots, lifelong issue  Takes a daily multi vitamin      Problem list and histories reviewed & adjusted, as indicated.  Additional history: as documented    Patient Active Problem List   Diagnosis     Hyperthyroidism     Vitamin D insufficiency     Obesity (BMI 30.0-34.9)     H. pylori infection     Past Surgical History:   Procedure Laterality Date     NO HISTORY OF SURGERY         Social History   Substance Use Topics     Smoking status: Never Smoker     Smokeless tobacco: Never Used     Alcohol use No     Family History   Problem Relation Age of Onset     Family History Negative No family hx of      Cancer No family hx of      Thyroid Disease No family hx of      Glaucoma No family hx of      Macular Degeneration No family hx of      Asthma No family hx of      C.A.D. No family hx of      Endocrine Disease No family hx of      HEART DISEASE No family hx of      Psychotic Disorder No family hx of      Blood Disease No family hx of      GASTROINTESTINAL DISEASE No family hx of      Diabetes Mother      Hypertension Mother      Lipids Mother      Cerebrovascular Disease Maternal Grandmother      Neurologic Disorder Father      migraine         Current Outpatient Prescriptions   Medication Sig Dispense Refill     ferrous gluconate (FERGON) 324 (38 Fe) MG tablet Take 1 tablet (324  "mg) by mouth daily (with breakfast) 100 tablet 0     omeprazole 20 MG tablet Take 1 tablet (20 mg) by mouth daily Take 30-60 minutes before a meal. 30 tablet 0     ondansetron (ZOFRAN ODT) 4 MG ODT tab Take 1-2 tablets (4-8 mg) by mouth 3 times daily (before meals) 20 tablet 1     No Known Allergies  BP Readings from Last 3 Encounters:   06/26/18 123/83   06/25/18 147/80   12/22/17 134/80    Wt Readings from Last 3 Encounters:   06/26/18 190 lb (86.2 kg)   06/25/18 190 lb 9.6 oz (86.5 kg)   12/22/17 212 lb (96.2 kg)                  Labs reviewed in EPIC    Reviewed and updated as needed this visit by clinical staff  Tobacco  Allergies  Meds       Reviewed and updated as needed this visit by Provider         ROS:  Constitutional, neuro, ENT, endocrine, pulmonary, cardiac, gastrointestinal, genitourinary, musculoskeletal, integument and psychiatric systems are negative, except as otherwise noted.    OBJECTIVE:                                                    /83  Pulse 79  Temp 98.5  F (36.9  C) (Oral)  Ht 5' 6\" (1.676 m)  Wt 190 lb (86.2 kg)  LMP 06/24/2018 (Exact Date)  SpO2 99%  Breastfeeding? No  BMI 30.67 kg/m2  Body mass index is 30.67 kg/(m^2).  GENERAL APPEARANCE: healthy, alert and no distress  EYES: Eyes grossly normal to inspection and conjunctivae and sclerae normal  NECK: trachea midline and normal to palpation  RESP: lungs clear to auscultation - no rales, rhonchi or wheezes  CV: regular rates and rhythm, normal S1 S2, no S3 or S4 and no murmur, click or rub  ABDOMEN: non distended, no rebound or guarding, some tenderness in the epigastrium   MS: extremities normal- no gross deformities noted and peripheral pulses normal  SKIN: no suspicious lesions or rashes  NEURO: Normal strength and tone, mentation intact and speech normal  PSYCH: mentation appears normal    Diagnostic test results:  Diagnostic Test Results:  Results for orders placed or performed in visit on 06/25/18   XR Abdomen " 2 Views    Narrative    ABDOMEN TWO VIEWS 6/25/2018 9:09 PM     HISTORY: Abdominal pain, generalized.    COMPARISON: None      Impression    IMPRESSION: The bowel gas pattern is nonobstructive. Moderate volume  of stool throughout the colon. The right hemidiaphragm is excluded  from the upright view so it is not possible to evaluate for free  intraperitoneal air.    SALLY BREAUX MD          Office Visit on 06/25/2018   Component Date Value Ref Range Status     Sodium 06/25/2018 139  133 - 144 mmol/L Final     Potassium 06/25/2018 3.5  3.4 - 5.3 mmol/L Final     Chloride 06/25/2018 104  94 - 109 mmol/L Final     Carbon Dioxide 06/25/2018 25  20 - 32 mmol/L Final     Anion Gap 06/25/2018 10  3 - 14 mmol/L Final     Glucose 06/25/2018 85  70 - 99 mg/dL Final     Urea Nitrogen 06/25/2018 16  7 - 30 mg/dL Final     Creatinine 06/25/2018 0.64  0.52 - 1.04 mg/dL Final     GFR Estimate 06/25/2018 >90  >60 mL/min/1.7m2 Final    Non  GFR Calc     GFR Estimate If Black 06/25/2018 >90  >60 mL/min/1.7m2 Final    African American GFR Calc     Calcium 06/25/2018 8.8  8.5 - 10.1 mg/dL Final     Bilirubin Total 06/25/2018 0.3  0.2 - 1.3 mg/dL Final     Albumin 06/25/2018 3.8  3.4 - 5.0 g/dL Final     Protein Total 06/25/2018 7.7  6.8 - 8.8 g/dL Final     Alkaline Phosphatase 06/25/2018 50  40 - 150 U/L Final     ALT 06/25/2018 24  0 - 50 U/L Final     AST 06/25/2018 15  0 - 45 U/L Final     Lipase 06/25/2018 151  73 - 393 U/L Final     WBC 06/25/2018 8.8  4.0 - 11.0 10e9/L Final     RBC Count 06/25/2018 4.12  3.8 - 5.2 10e12/L Final     Hemoglobin 06/25/2018 8.2* 11.7 - 15.7 g/dL Final     Hematocrit 06/25/2018 26.7* 35.0 - 47.0 % Final     MCV 06/25/2018 65* 78 - 100 fl Final     MCH 06/25/2018 19.9* 26.5 - 33.0 pg Final     MCHC 06/25/2018 30.7* 31.5 - 36.5 g/dL Final     RDW 06/25/2018 18.8* 10.0 - 15.0 % Final     Platelet Count 06/25/2018 350  150 - 450 10e9/L Final     Diff Method 06/25/2018 Automated Method    Final     % Neutrophils 06/25/2018 68.0  % Final     % Lymphocytes 06/25/2018 20.9  % Final     % Monocytes 06/25/2018 10.1  % Final     % Eosinophils 06/25/2018 0.7  % Final     % Basophils 06/25/2018 0.3  % Final     Absolute Neutrophil 06/25/2018 6.0  1.6 - 8.3 10e9/L Final     Absolute Lymphocytes 06/25/2018 1.9  0.8 - 5.3 10e9/L Final     Absolute Monocytes 06/25/2018 0.9  0.0 - 1.3 10e9/L Final     Absolute Eosinophils 06/25/2018 0.1  0.0 - 0.7 10e9/L Final     Absolute Basophils 06/25/2018 0.0  0.0 - 0.2 10e9/L Final     Color Urine 06/25/2018 Yellow   Final     Appearance Urine 06/25/2018 Clear   Final     Glucose Urine 06/25/2018 Negative  NEG^Negative mg/dL Final     Bilirubin Urine 06/25/2018 Negative  NEG^Negative Final     Ketones Urine 06/25/2018 Trace* NEG^Negative mg/dL Final     Specific Gravity Urine 06/25/2018 >1.030  1.003 - 1.035 Final     Blood Urine 06/25/2018 Negative  NEG^Negative Final     pH Urine 06/25/2018 5.5  5.0 - 7.0 pH Final     Protein Albumin Urine 06/25/2018 Negative  NEG^Negative mg/dL Final     Urobilinogen Urine 06/25/2018 0.2  0.2 - 1.0 EU/dL Final     Nitrite Urine 06/25/2018 Negative  NEG^Negative Final     Leukocyte Esterase Urine 06/25/2018 Negative  NEG^Negative Final     Source 06/25/2018 Midstream Urine   Final     Sed Rate 06/25/2018 21* 0 - 20 mm/h Final     CRP Inflammation 06/25/2018 <2.9  0.0 - 8.0 mg/L Final     Beta HCG Qual IFA Urine 06/25/2018 Negative  NEG^Negative    Final       ASSESSMENT/PLAN:                                                    1. Abdominal pain, epigastric  -Was treated for H pylori in the past  -Ongoing issue over several years,hence proceed with EGD  - GASTROENTEROLOGY ADULT REF PROCEDURE ONLY  - CBC with platelets differential; Future  - omeprazole 20 MG tablet; Take 1 tablet (20 mg) by mouth daily Take 30-60 minutes before a meal.  Dispense: 30 tablet; Refill: 0    2. History of Helicobacter pylori infection  - GASTROENTEROLOGY ADULT  REF PROCEDURE ONLY    3. Iron deficiency anemia due to chronic blood loss  -Hemoglobin is down to 8.2  -recheck labs in 6 weeks  -if continues to be anemic with heavy periods then plan for US of the pelvis and GYN referral  -Stool for blood is pending  - CBC with platelets differential; Future  - Ferritin; Future  - Iron and iron binding capacity; Future  - ferrous gluconate (FERGON) 324 (38 Fe) MG tablet; Take 1 tablet (324 mg) by mouth daily (with breakfast)  Dispense: 100 tablet; Refill: 0    4. Vitamin D insufficiency  -Last levels were 18(12/17)   - Vitamin D Deficiency; Future      Follow up with Provider - recheck labs in 6 weeks      Edith Fisher MD MPH    [facility]

## 2018-06-26 NOTE — PATIENT INSTRUCTIONS
"   * FOOD POISONING or VIRAL GASTROENTERITIS (6yr-Adult)  FOOD POISONING may occur from 6 to 24 hours after eating food that has spoiled and lasts up to1-2 days. VIRAL GASTRO-ENTERITIS is commonly known as the \"stomach flu\" and may last 2-7 days. Symptoms of both illnesses may include vomiting, diarrhea, fever, abdominal cramping. Antibiotics are not effective, but simple home treatment will be helpful.  HOME CARE:      If symptoms are severe, rest at home for the next 24 hours.    Avoid tobacco and alcohol. These may worsen your symptoms.    If medicines for diarrhea (low dose of Immodium: one tablet a day for an adult) or vomiting were prescribed, take only as directed.   During the first 12 to 24 hours follow the diet below:    DRINKS: Sport drinks like Gatorade, soft drinks without caffeine; ginger ale, mineral water (plain or flavored), decaffeinated tea and coffee.    SOUPS: Clear broth, consommé and bouillon    DESSERTS: Plain gelatin (Jell-O), popsicles and fruit juice bars.  During the next 24 hours you may add the following to the above:    Hot cereal, plain toast, bread, rolls, crackers    Plain noodles, rice, mashed potatoes, chicken noodle or rice soup    Unsweetened canned fruit (avoid pineapple), bananas    Limit fat intake to less than 15 grams per day by avoiding margarine, butter, oils, mayonnaise, sauces, gravies, fried foods, peanut butter, meat, poultry and fish.    Limit fiber; avoid raw or cooked vegetables, fresh fruits (except bananas) and bran cereals.    Limit caffeine and chocolate. No spices or seasonings except salt.  Slowly go back to a normal diet as you feel better and your symptoms lessen.  FOLLOW UP with your doctor as advised if you are not better in 2 days. If a stool (diarrhea) sample was taken, you may call in 2 days (or as directed) for the results.  GET PROMPT MEDICAL ATTENTION if any of the following occur:    Increasing abdominal pain or constant pain in one " spot    Continued vomiting (unable to keep liquids down)    Frequent diarrhea (more than 5 times a day)    Blood in vomit or stool (black or red color)    Unable to take in fluids at all    No urine output for 12 hours or extreme thirst    Weakness, dizziness, fainting    Drowsiness, confusion, stiff neck or seizure    Fever over 101.0  F (38.3  C) for more than 3 days    New rash    7182-4281 [employer]. [address] [place] [address], [place] [address]. All rights reserved. This information is not intended as a substitute for professional medical care. Always follow your healthcare professional's instructions.  This information has been modified by your health care provider with permission from the publisher.    Iron-Deficiency Anemia (Adult)  Red blood cells carry oxygen to the tissues of your body. Anemia is a condition in which you have too few red blood cells. You need iron to make red cells. Anemia makes you feel tired and run down. When anemia becomes severe, your skin becomes pale. You may feel short of breath after physical activity. Other symptoms include:    Headaches    Dizziness    Leg cramps with physical activity    Drowsiness    Restless legs  Your anemia is caused by not having enough iron in your body. This may be because of:    Loss of blood. This can be caused by heavy menstrual periods. It can also be caused by bleeding from the stomach or intestines.    Poor diet. You may not be eating enough foods that contain iron.    Inability to absorb iron from the foods you eat    Pregnancy  If your blood count is low enough, your healthcare provider may prescribe an iron supplement. It usually takes about 2 to 3 months of treatment with iron supplements to correct anemia. Severe cases of anemia need a blood transfusion to quickly ease symptoms and deliver more oxygen to the cells.  Home care  Follow these guidelines when caring for yourself at home:    Eat foods high in iron. This will boost the  amount of iron stored in your body. It is a natural way to build up the number of blood cells. Good sources of iron include beef, liver, spinach and other dark green leafy vegetables, whole grains, beans, and nuts.    Do not overexert yourself.    Talk with your healthcare provider before traveling by air or traveling to high altitudes.  Follow-up care  Follow up with your healthcare provider in 2 months, or as advised. This is to have another red blood cell count to be sure your anemia has been fixed.  When to seek medical advice  Call your healthcare provider right away if any of these occur:    Shortness of breath or chest pain    Dizziness or fainting    Vomiting blood or passing red or black-colored stool   Date Last Reviewed: 2/25/2016 2000-2017 [employer] [address] [place] [address], [place] [address]. All rights reserved. This information is not intended as a substitute for professional medical care. Always follow your healthcare professional's instructions.

## 2018-06-26 NOTE — MR AVS SNAPSHOT
After Visit Summary   6/26/2018    Stuart Daily    MRN: [id number]           Patient Information     Date Of Birth          1975        Visit Information        Provider Department      6/26/2018 10:20 AM Edith Fisher MD [facility]        Today's Diagnoses     Abdominal pain, epigastric    -  1    History of Helicobacter pylori infection        Iron deficiency anemia due to chronic blood loss        Vitamin D insufficiency          Care Instructions    At [facility], we strive to deliver an exceptional experience to you, every time we see you.  If you receive a survey in the mail, please send us back your thoughts. We really do value your feedback.    Based on your medical history, these are the current health maintenance/preventive care services that you are due for (some may have been done at this visit.)  There are no preventive care reminders to display for this patient.    Suggested websites for health information:  Www.[facility] : Up to date and easily searchable information on multiple topics.  Www.medlineplus.gov : medication info, interactive tutorials, watch real surgeries online  Www.familydoctor.org : good info from the Academy of Family Physicians  Www.cdc.gov : public health info, travel advisories, epidemics (H1N1)  Www.aap.org : children's health info, normal development, vaccinations  Www.health.[facility].[place].us : [place] dept of health, public health issues in [place], N1N1    Your care team:                            Family Medicine Internal Medicine   MD Javon Lombardo MD Shantel Branch-Fleming, MD Katya Georgiev PA-C Megan Hill, APRN KHOA Nelson MD Pediatrics   MUMTAZ Felix, MD Char Montero APRN CNP   MD Ashley Reyes MD Deborah Mielke, MD Kim Thein, APRN [facility]      Clinic hours: Monday - Thursday 7 am-7 pm; Fridays 7 am-5 pm.   Urgent care: Monday -  Friday 11 am-9 pm; Saturday and Sunday 9 am-5 pm.  Pharmacy : Monday -Thursday 8 am-8 pm; Friday 8 am-6 pm; Saturday and Sunday 9 am-5 pm.     Clinic: [phone]   Pharmacy: [phone]                Follow-ups after your visit        Additional Services     GASTROENTEROLOGY ADULT REF PROCEDURE ONLY       Last Lab Result: Creatinine (mg/dL)       Date                     Value                 12/06/2016               0.72             ----------  Body mass index is 30.67 kg/(m^2).     Needed:  No  Language:  English    Patient will be contacted to schedule procedure.     Please be aware that coverage of these services is subject to the terms and limitations of your health insurance plan.  Call member services at your health plan with any benefit or coverage questions.  Any procedures must be performed at a [facility] facility OR coordinated by your clinic's referral office.    Please bring the following with you to your appointment:    (1) Any X-Rays, CTs or MRIs which have been performed.  Contact the facility where they were done to arrange for  prior to your scheduled appointment.    (2) List of current medications   (3) This referral request   (4) Any documents/labs given to you for this referral                  Future tests that were ordered for you today     Open Future Orders        Priority Expected Expires Ordered    CBC with platelets differential Routine  12/26/2018 6/26/2018    Ferritin Routine  12/26/2018 6/26/2018    Iron and iron binding capacity Routine  12/26/2018 6/26/2018    Vitamin D Deficiency Routine  12/26/2018 6/26/2018    Occult blood stool 1-3 spec Routine  6/25/2019 6/25/2018            Who to contact     If you have questions or need follow up information about today's clinic visit or your schedule please contact [facility] directly at [phone].  Normal or non-critical lab and imaging results will be communicated to you by MyChart, letter or  "phone within 4 business days after the clinic has received the results. If you do not hear from us within 7 days, please contact the clinic through [employer] or phone. If you have a critical or abnormal lab result, we will notify you by phone as soon as possible.  Submit refill requests through [employer] or call your pharmacy and they will forward the refill request to us. Please allow 3 business days for your refill to be completed.          Additional Information About Your Visit        [employer] Information     [employer] lets you send messages to your doctor, view your test results, renew your prescriptions, schedule appointments and more. To sign up, go to [email or web address] . Click on \"Log in\" on the left side of the screen, which will take you to the Welcome page. Then click on \"Sign up Now\" on the right side of the page.     You will be asked to enter the access code listed below, as well as some personal information. Please follow the directions to create your username and password.     Your access code is: [email or web address]  Expires: 2018  9:24 PM     Your access code will  in 90 days. If you need help or a new code, please call your [place] clinic or [phone].        Care EveryWhere ID     This is your Care EveryWhere ID. This could be used by other organizations to access your [place] medical records  [id number]        Your Vitals Were     Pulse Temperature Height Last Period Pulse Oximetry Breastfeeding?    79 98.5  F (36.9  C) (Oral) 5' 6\" (1.676 m) 2018 (Exact Date) 99% No    BMI (Body Mass Index)                   30.67 kg/m2            Blood Pressure from Last 3 Encounters:   18 123/83   18 147/80   17 134/80    Weight from Last 3 Encounters:   18 190 lb (86.2 kg)   18 190 lb 9.6 oz (86.5 kg)   17 212 lb (96.2 kg)              We Performed the Following     GASTROENTEROLOGY ADULT REF PROCEDURE ONLY          Today's Medication Changes          These " changes are accurate as of 6/26/18 10:45 AM.  If you have any questions, ask your nurse or doctor.               Start taking these medicines.        Dose/Directions    ferrous gluconate 324 (38 Fe) MG tablet   Commonly known as:  FERGON   Used for:  Iron deficiency anemia due to chronic blood loss   Started by:  Edith Fisher MD        Dose:  324 mg   Take 1 tablet (324 mg) by mouth daily (with breakfast)   Quantity:  100 tablet   Refills:  0       omeprazole 20 MG tablet   Used for:  Abdominal pain, epigastric   Started by:  Edith Fisher MD        Dose:  20 mg   Take 1 tablet (20 mg) by mouth daily Take 30-60 minutes before a meal.   Quantity:  30 tablet   Refills:  0            Where to get your medicines      These medications were sent to [place] [address] [place] [facility], [place] - [address]  [address] [facility] [address]     Phone:  [phone]     ferrous gluconate 324 (38 Fe) MG tablet    omeprazole 20 MG tablet                Primary Care Provider Office Phone # Fax #    Gabriel Spangler [phone]       [address] [facility] [address]        Equal Access to Services     [facility]: Hadii roger ku hadasho Soreuben, waaxda luqadaha, qaybta kaalmada adeegyada, jenni campbell . So [facility] [phone].    ATENCIÓN: Si habla español, tiene a fontenot disposición servicios gratuitos de asistencia lingüística. [facility] [phone].    We comply with applicable federal civil rights laws and [place] laws. We do not discriminate on the basis of race, color, national origin, age, disability, sex, sexual orientation, or gender identity.            Thank you!     Thank you for choosing [facility]  for your care. Our goal is always to provide you with excellent care. Hearing back from our patients is one way we can continue to improve our services. Please take a few minutes to complete the written survey that you may receive in the  mail after your visit with us. Thank you!             Your Updated Medication List - Protect others around you: Learn how to safely use, store and throw away your medicines at www.disposemymeds.org.          This list is accurate as of 6/26/18 10:45 AM.  Always use your most recent med list.                   Brand Name Dispense Instructions for use Diagnosis    ferrous gluconate 324 (38 Fe) MG tablet    FERGON    100 tablet    Take 1 tablet (324 mg) by mouth daily (with breakfast)    Iron deficiency anemia due to chronic blood loss       omeprazole 20 MG tablet     30 tablet    Take 1 tablet (20 mg) by mouth daily Take 30-60 minutes before a meal.    Abdominal pain, epigastric       ondansetron 4 MG ODT tab    ZOFRAN ODT    20 tablet    Take 1-2 tablets (4-8 mg) by mouth 3 times daily (before meals)    Gastroenteritis

## 2018-06-26 NOTE — PROGRESS NOTES
SUBJECTIVE:    Chief Complaint   Patient presents with     Vomiting     Patient complains of vomiting and stomach pain      HPI:  Aunelza Daily is a 43 year old female who presents with the CC of vomiting and abdominal  pain.    Pain is located in the generalized area, with radiation to None.  The pain is characterized as aching and cramping,   Pain at worst is a level 8 on a scale of 1-10.   Now pain with no palpation is 0/10  Pain has been present for 4 day(s) and is slowly improving.  EXACERBATING FACTORS: eating  RELIEVING FACTORS: vomiting relieves pain for some hours-  She vomited 3 x , then 1 x then 1 time today and the previous 2 days.  ASSOCIATED SX: nausea, vomiting and chills.  Patient denies diarrhea, constipation, bloating, melena, hematochezia, dysuria, frequency, hematuria, fever, arthralgias and myalgias     Last time the patient passed stool- yesterday normal  Last time the patient urinated- today, no dysuria  Last time the patient was eating/ drinking - little drinking and minimal eating today    Surgical History :      Appendectomy   No  cholecystectomy    No   Colon or bowel surgery -  No  Diverticulitis history -   No  Endometriosis -   No  hysterectomy    No    Past Medical History:   Diagnosis Date     Hyperthyroidism 2012    mild Graves disease? Endocrinology-no tx     Patient Active Problem List   Diagnosis     Hyperthyroidism     Vitamin D insufficiency     Obesity (BMI 30.0-34.9)     H. pylori infection       ALLERGIES:  Review of patient's allergies indicates no known allergies.      No current outpatient prescriptions on file prior to visit.  No current facility-administered medications on file prior to visit.     Social History   Substance Use Topics     Smoking status: Never Smoker     Smokeless tobacco: Never Used     Alcohol use No       Family History   Problem Relation Age of Onset     Family History Negative No family hx of      Cancer No family hx of      Thyroid Disease No  family hx of      Glaucoma No family hx of      Macular Degeneration No family hx of      Asthma No family hx of      C.A.D. No family hx of      Endocrine Disease No family hx of      HEART DISEASE No family hx of      Psychotic Disorder No family hx of      Blood Disease No family hx of      GASTROINTESTINAL DISEASE No family hx of      Diabetes Mother      Hypertension Mother      Lipids Mother      Cerebrovascular Disease Maternal Grandmother      Neurologic Disorder Father      migraine     ROS:    CONSTITUTIONAL:NEGATIVE for fever, has  chills,   INTEGUMENTARY/SKIN: NEGATIVE for worrisome rashes, moles or lesions  EYES: NEGATIVE for vision changes or irritation  ENT/MOUTH: NEGATIVE for ear, mouth and throat problems  RESP:NEGATIVE for significant cough or SOB       OBJECTIVE:  /80 (BP Location: Left arm, Patient Position: Chair, Cuff Size: Adult Regular)  Pulse 79  Temp 98.6  F (37  C) (Oral)  Wt 190 lb 9.6 oz (86.5 kg)  SpO2 100%  BMI 30.76 kg/m2  GENERAL APPEARANCE: alert, mild distress, cooperative and  Wrapped in a blanket due to chills  EYES: EOMI,  PERRL, conjunctiva clear  HENT: ear canals and TM's normal.  Nose and mouth without ulcers, erythema or lesions  NECK: supple, nontender, no lymphadenopathy  RESP: lungs clear to auscultation - no rales, rhonchi or wheezes  CV: regular rates and rhythm, normal S1 S2, no murmur noted  ABDOMEN: obese, soft, normal bowel sounds, tenderness moderate generalized  NEURO: Normal strength and tone, sensory exam grossly normal,  normal speech and mentation  SKIN: no suspicious lesions or rashes      Labs:    Results for orders placed or performed in visit on 06/25/18   CBC with platelets differential   Result Value Ref Range    WBC 8.8 4.0 - 11.0 10e9/L    RBC Count 4.12 3.8 - 5.2 10e12/L    Hemoglobin 8.2 (L) 11.7 - 15.7 g/dL    Hematocrit 26.7 (L) 35.0 - 47.0 %    MCV 65 (L) 78 - 100 fl    MCH 19.9 (L) 26.5 - 33.0 pg    MCHC 30.7 (L) 31.5 - 36.5 g/dL     RDW 18.8 (H) 10.0 - 15.0 %    Platelet Count 350 150 - 450 10e9/L    Diff Method Automated Method     % Neutrophils 68.0 %    % Lymphocytes 20.9 %    % Monocytes 10.1 %    % Eosinophils 0.7 %    % Basophils 0.3 %    Absolute Neutrophil 6.0 1.6 - 8.3 10e9/L    Absolute Lymphocytes 1.9 0.8 - 5.3 10e9/L    Absolute Monocytes 0.9 0.0 - 1.3 10e9/L    Absolute Eosinophils 0.1 0.0 - 0.7 10e9/L    Absolute Basophils 0.0 0.0 - 0.2 10e9/L   UA reflex to Microscopic and Culture   Result Value Ref Range    Color Urine Yellow     Appearance Urine Clear     Glucose Urine Negative NEG^Negative mg/dL    Bilirubin Urine Negative NEG^Negative    Ketones Urine Trace (A) NEG^Negative mg/dL    Specific Gravity Urine >1.030 1.003 - 1.035    Blood Urine Negative NEG^Negative    pH Urine 5.5 5.0 - 7.0 pH    Protein Albumin Urine Negative NEG^Negative mg/dL    Urobilinogen Urine 0.2 0.2 - 1.0 EU/dL    Nitrite Urine Negative NEG^Negative    Leukocyte Esterase Urine Negative NEG^Negative    Source Midstream Urine    Erythrocyte sedimentation rate auto   Result Value Ref Range    Sed Rate 21 (H) 0 - 20 mm/h   Beta HCG qual IFA urine   Result Value Ref Range    Beta HCG Qual IFA Urine Negative NEG^Negative         Abdominal x-ray:No obstruction, no free air,  No dilated bowel,  average amount of stool    ASSESSMENT:  Abdominal pain, generalized     - Comprehensive metabolic panel  - Lipase  - XR Abdomen 2 Views; Future  - CBC with platelets differential  - UA reflex to Microscopic and Culture  - Erythrocyte sedimentation rate auto  - CRP inflammation  - Beta HCG qual IFA urine       We discussed some of the many possible causes of abdominal pain including appendicitis, cholecystitis, diverticulitis, hepatitis, colitis, gastritis,  A stone in the gallbladder, pancreas, kidney or ureter, constipation, obstructed bowel, kidney or bladder infection, cancers, UTI, ovarian cysts,        We also discussed the limited capacity to evaluate these  conditions in the urgent care.       Discussed that the lab results did not identify the exact cause of the abdominal pain, but the testing has confirmed that  He/she does not have some important causes of pain  Labs indicate unlikely urinary tract/ kidney infection or stone,  There is unlikely significant intraabdominal infection like appendicitis, cholecystitis or diverticulitis,  no bowel obstruction, no significant constipation.  Testing for inflammation of liver, gallbladder and pancreas is pending.    Iron deficiency anemia, unspecified iron deficiency anemia type     - Occult blood stool 1-3 spec; Future  She says she takes prenatal vitamins daily     Her degree of anemia is concerning.  Discussed the possibility of upper or lower GI blood loss/ malignancy-  She denies melena or red stool-  She says she has heavy menses as the probable cause of blood loss.   She denies past anemia, though 2 years ago her HGB was 10  Will do occult blood stool testing    Gastroenteritis     - ondansetron (ZOFRAN ODT) 4 MG ODT tab; Take 1-2 tablets (4-8 mg) by mouth 3 times daily (before meals)        She will follow-up with primary care for further evaluation/ treatment-  Consider blood morphology,  Reticulocyte count,  Abd. CT to evaluate for malignancy,  Upper GI endoscopy,  Colonoscopy      Patient was counselled that if the abdominal symptoms worsen, especially with worsening pain, associated fever, chills, and/or vomiting with inability to keep down foods and liquids, blood in the urine, stool or vomitus  that they should be re-evaluated in the Emergency Department.

## 2018-06-28 PROCEDURE — 82270 OCCULT BLOOD FECES: CPT | Performed by: FAMILY MEDICINE

## 2018-07-02 DIAGNOSIS — D50.9 IRON DEFICIENCY ANEMIA, UNSPECIFIED IRON DEFICIENCY ANEMIA TYPE: ICD-10-CM

## 2018-07-02 LAB
COLLECT DATE SP2 STL: NORMAL
COLLECT DATE SP3 STL: NORMAL
COLLECT DATE STL: NORMAL
HEMOCCULT SP1 STL QL: NEGATIVE
HEMOCCULT SP2 STL QL: NEGATIVE
HEMOCCULT SP3 STL QL: NEGATIVE

## 2018-07-16 ENCOUNTER — HOSPITAL ENCOUNTER (OUTPATIENT)
Facility: AMBULATORY SURGERY CENTER | Age: 43
Discharge: HOME | End: 2018-07-16
Attending: SURGERY | Admitting: SURGERY
Payer: COMMERCIAL

## 2018-07-16 ENCOUNTER — SURGERY (OUTPATIENT)
Age: 43
End: 2018-07-16

## 2018-07-16 VITALS
DIASTOLIC BLOOD PRESSURE: 78 MMHG | TEMPERATURE: 97.6 F | RESPIRATION RATE: 16 BRPM | OXYGEN SATURATION: 98 % | HEART RATE: 72 BPM | SYSTOLIC BLOOD PRESSURE: 134 MMHG

## 2018-07-16 LAB — UPPER GI ENDOSCOPY: NORMAL

## 2018-07-16 PROCEDURE — G8918 PT W/O PREOP ORDER IV AB PRO: HCPCS

## 2018-07-16 PROCEDURE — G8907 PT DOC NO EVENTS ON DISCHARG: HCPCS

## 2018-07-16 PROCEDURE — 43239 EGD BIOPSY SINGLE/MULTIPLE: CPT

## 2018-07-16 PROCEDURE — 88305 TISSUE EXAM BY PATHOLOGIST: CPT | Performed by: PATHOLOGY

## 2018-07-16 PROCEDURE — 43239 EGD BIOPSY SINGLE/MULTIPLE: CPT | Performed by: SURGERY

## 2018-07-16 RX ORDER — LIDOCAINE 40 MG/G
CREAM TOPICAL
Status: DISCONTINUED | OUTPATIENT
Start: 2018-07-16 | End: 2018-07-17 | Stop reason: HOSPADM

## 2018-07-16 RX ORDER — FENTANYL CITRATE 50 UG/ML
INJECTION, SOLUTION INTRAMUSCULAR; INTRAVENOUS PRN
Status: DISCONTINUED | OUTPATIENT
Start: 2018-07-16 | End: 2018-07-16 | Stop reason: HOSPADM

## 2018-07-16 RX ORDER — PRENATAL VIT/IRON FUM/FOLIC AC 27MG-0.8MG
1 TABLET ORAL DAILY
COMMUNITY
End: 2021-11-12

## 2018-07-16 RX ORDER — ONDANSETRON 2 MG/ML
4 INJECTION INTRAMUSCULAR; INTRAVENOUS
Status: DISCONTINUED | OUTPATIENT
Start: 2018-07-16 | End: 2018-07-17 | Stop reason: HOSPADM

## 2018-07-16 RX ADMIN — FENTANYL CITRATE 100 MCG: 50 INJECTION, SOLUTION INTRAMUSCULAR; INTRAVENOUS at 09:26

## 2018-07-17 LAB — COPATH REPORT: NORMAL

## 2018-07-23 DIAGNOSIS — R10.13 ABDOMINAL PAIN, EPIGASTRIC: ICD-10-CM

## 2018-07-23 NOTE — TELEPHONE ENCOUNTER
"Requested Prescriptions   Pending Prescriptions Disp Refills     omeprazole 20 MG tablet [Pharmacy Med Name: OMEPRAZOLE DR 20 MG TABLET]  Last Written Prescription Date:  06/26/18  Last Fill Quantity: 30,  # refills: 0   Last Office Visit with G, P or [facility] prescribing provider:  06/26/18   Future Office Visit:    30 tablet 0     Sig: TAKE 1 TABLET (20 MG) BY MOUTH DAILY 30-60 MINUTES BEFORE A MEAL.    PPI Protocol Passed    7/23/2018  1:40 AM       Passed - Not on Clopidogrel (unless Pantoprazole ordered)       Passed - No diagnosis of osteoporosis on record       Passed - Recent (12 mo) or future (30 days) visit within the authorizing provider's specialty    Patient had office visit in the last 12 months or has a visit in the next 30 days with authorizing provider or within the authorizing provider's specialty.  See \"Patient Info\" tab in inbasket, or \"Choose Columns\" in Meds & Orders section of the refill encounter.           Passed - Patient is age 18 or older       Passed - No active pregnacy on record       Passed - No positive pregnancy test in past 12 months          "

## 2018-07-24 RX ORDER — NICOTINE POLACRILEX 4 MG/1
GUM, CHEWING ORAL
Qty: 30 TABLET | Refills: 2 | Status: SHIPPED | OUTPATIENT
Start: 2018-07-24 | End: 2019-08-12

## 2018-07-24 NOTE — TELEPHONE ENCOUNTER
Prescription approved per [facility].    Britney Russ RN  [facility], [place]

## 2018-07-30 ENCOUNTER — TELEPHONE (OUTPATIENT)
Dept: SURGERY | Facility: CLINIC | Age: 43
End: 2018-07-30

## 2018-07-30 DIAGNOSIS — K21.9 GASTROESOPHAGEAL REFLUX DISEASE WITHOUT ESOPHAGITIS: Primary | ICD-10-CM

## 2018-07-30 RX ORDER — METRONIDAZOLE 500 MG/1
500 TABLET ORAL 3 TIMES DAILY
Qty: 40 TABLET | Refills: 0 | Status: SHIPPED | OUTPATIENT
Start: 2018-07-30 | End: 2019-08-12

## 2018-07-30 RX ORDER — CLARITHROMYCIN 500 MG
500 TABLET ORAL 2 TIMES DAILY
Qty: 28 TABLET | Refills: 0 | Status: SHIPPED | OUTPATIENT
Start: 2018-07-30 | End: 2019-08-12

## 2018-07-30 NOTE — TELEPHONE ENCOUNTER
Reason for Call:  Request for results:    Name of test or procedure: EGD    Date of test of procedure: 7-16-18    Location of the test or procedure: [facility]    OK to leave the result message on voice mail or with a family member? Not Applicable    Phone number Patient can be reached at:  Other phone number:  [phone]*    Additional comments: Please call with results. Thank you.    Call taken on 7/30/2018 at 7:40 AM by Melissa Grubbs

## 2018-08-13 DIAGNOSIS — R10.13 ABDOMINAL PAIN, EPIGASTRIC: ICD-10-CM

## 2018-08-13 DIAGNOSIS — D50.0 IRON DEFICIENCY ANEMIA DUE TO CHRONIC BLOOD LOSS: ICD-10-CM

## 2018-08-13 DIAGNOSIS — E55.9 VITAMIN D INSUFFICIENCY: ICD-10-CM

## 2018-08-13 LAB
DEPRECATED CALCIDIOL+CALCIFEROL SERPL-MC: 25 UG/L (ref 20–75)
DIFFERENTIAL METHOD BLD: ABNORMAL
EOSINOPHIL # BLD AUTO: 0.3 10E9/L (ref 0–0.7)
EOSINOPHIL NFR BLD AUTO: 4 %
ERYTHROCYTE [DISTWIDTH] IN BLOOD BY AUTOMATED COUNT: ABNORMAL % (ref 10–15)
FERRITIN SERPL-MCNC: 11 NG/ML (ref 12–150)
HCT VFR BLD AUTO: 31.7 % (ref 35–47)
HGB BLD-MCNC: 10 G/DL (ref 11.7–15.7)
IRON SATN MFR SERPL: 25 % (ref 15–46)
IRON SERPL-MCNC: 90 UG/DL (ref 35–180)
LYMPHOCYTES # BLD AUTO: 3.5 10E9/L (ref 0.8–5.3)
LYMPHOCYTES NFR BLD AUTO: 52 %
MCH RBC QN AUTO: 24.4 PG (ref 26.5–33)
MCHC RBC AUTO-ENTMCNC: 31.5 G/DL (ref 31.5–36.5)
MCV RBC AUTO: 78 FL (ref 78–100)
MONOCYTES # BLD AUTO: 0.9 10E9/L (ref 0–1.3)
MONOCYTES NFR BLD AUTO: 14 %
NEUTROPHILS # BLD AUTO: 2 10E9/L (ref 1.6–8.3)
NEUTROPHILS NFR BLD AUTO: 30 %
PLATELET # BLD AUTO: 349 10E9/L (ref 150–450)
PLATELET # BLD EST: ABNORMAL 10*3/UL
RBC # BLD AUTO: 4.09 10E12/L (ref 3.8–5.2)
RBC MORPH BLD: NORMAL
TIBC SERPL-MCNC: 362 UG/DL (ref 240–430)
WBC # BLD AUTO: 6.7 10E9/L (ref 4–11)

## 2018-08-13 PROCEDURE — 83550 IRON BINDING TEST: CPT | Performed by: PREVENTIVE MEDICINE

## 2018-08-13 PROCEDURE — 83540 ASSAY OF IRON: CPT | Performed by: PREVENTIVE MEDICINE

## 2018-08-13 PROCEDURE — 36415 COLL VENOUS BLD VENIPUNCTURE: CPT | Performed by: PREVENTIVE MEDICINE

## 2018-08-13 PROCEDURE — 85025 COMPLETE CBC W/AUTO DIFF WBC: CPT | Performed by: PREVENTIVE MEDICINE

## 2018-08-13 PROCEDURE — 82728 ASSAY OF FERRITIN: CPT | Performed by: PREVENTIVE MEDICINE

## 2018-08-13 PROCEDURE — 82306 VITAMIN D 25 HYDROXY: CPT | Performed by: PREVENTIVE MEDICINE

## 2018-08-20 ENCOUNTER — OFFICE VISIT (OUTPATIENT)
Dept: SURGERY | Facility: CLINIC | Age: 43
End: 2018-08-20
Payer: COMMERCIAL

## 2018-08-20 VITALS
HEIGHT: 68 IN | DIASTOLIC BLOOD PRESSURE: 88 MMHG | SYSTOLIC BLOOD PRESSURE: 137 MMHG | BODY MASS INDEX: 29.7 KG/M2 | HEART RATE: 68 BPM | WEIGHT: 196 LBS

## 2018-08-20 DIAGNOSIS — A04.8 H. PYLORI INFECTION: Primary | ICD-10-CM

## 2018-08-20 PROCEDURE — 99212 OFFICE O/P EST SF 10 MIN: CPT | Performed by: SURGERY

## 2018-08-20 RX ORDER — METHIMAZOLE 5 MG/1
5 TABLET ORAL
Status: CANCELLED | OUTPATIENT
Start: 2018-08-20

## 2018-08-20 NOTE — PROGRESS NOTES
"Pt returns for follow up after her EGD    Pain has improved. Stopped therapy a couple days ago.     Patient has not family history of GERD, or H pylori problems    Physical exam: Vitals: /88  Pulse 68  Ht 1.727 m (5' 8\")  Wt 88.9 kg (196 lb)  BMI 29.8 kg/m2  BMI= Body mass index is 29.8 kg/(m^2).    Exam:  Constitutional: healthy, alert and no distress  Respiratory: Non-labored  Gastrointestinal: Abdomen soft, non-tender. BS normal. No masses, organomegaly    Assessment: Pt is doing well     Plan: Will need a test to confirm H pylori eradication in 4 weeks, will have her stay on PPI for 3 months total and then do a trial of no PPI.     Dustin Troncoso, DO  "

## 2018-08-20 NOTE — LETTER
"    8/20/2018         RE: Stuart Daily  [address] [place] [address]        Dear Colleague,    Thank you for referring your patient, Stuart Daily, to the [facility]. Please see a copy of my visit note below.    Pt returns for follow up after her EGD    Pain has improved. Stopped therapy a couple days ago.     Patient has not family history of GERD, or H pylori problems    Physical exam: Vitals: /88  Pulse 68  Ht 1.727 m (5' 8\")  Wt 88.9 kg (196 lb)  BMI 29.8 kg/m2  BMI= Body mass index is 29.8 kg/(m^2).    Exam:  Constitutional: healthy, alert and no distress  Respiratory: Non-labored  Gastrointestinal: Abdomen soft, non-tender. BS normal. No masses, organomegaly    Assessment: Pt is doing well     Plan: Will need a test to confirm H pylori eradication in 4 weeks, will have her stay on PPI for 3 months total and then do a trial of no PPI.     Dustin Troncoso, DO    Again, thank you for allowing me to participate in the care of your patient.        Sincerely,        Dustin Troncoso, DO    "

## 2018-08-20 NOTE — MR AVS SNAPSHOT
"              After Visit Summary   2018    Stuart Daily    MRN: [id number]           Patient Information     Date Of Birth          1975        Visit Information        Provider Department      2018 1:30 PM Dustin Troncoso,  [facility]        Today's Diagnoses     H. pylori infection    -  1       Follow-ups after your visit        Who to contact     If you have questions or need follow up information about today's clinic visit or your schedule please contact [facility] directly at [phone].  Normal or non-critical lab and imaging results will be communicated to you by Readyforce[employer], letter or phone within 4 business days after the clinic has received the results. If you do not hear from us within 7 days, please contact the clinic through [employer] or phone. If you have a critical or abnormal lab result, we will notify you by phone as soon as possible.  Submit refill requests through [employer] or call your pharmacy and they will forward the refill request to us. Please allow 3 business days for your refill to be completed.          Additional Information About Your Visit        MyChart Information     [employer] lets you send messages to your doctor, view your test results, renew your prescriptions, schedule appointments and more. To sign up, go to [email or web address] . Click on \"Log in\" on the left side of the screen, which will take you to the Welcome page. Then click on \"Sign up Now\" on the right side of the page.     You will be asked to enter the access code listed below, as well as some personal information. Please follow the directions to create your username and password.     Your access code is: [email or web address]  Expires: 2018  1:21 PM     Your access code will  in 90 days. If you need help or a new code, please call your [facility] or [phone].        Care EveryWhere ID     This is your Care EveryWhere ID. This could be used by other " "organizations to access your [place] medical records  [id number]        Your Vitals Were     Pulse Height BMI (Body Mass Index)             68 1.727 m (5' 8\") 29.8 kg/m2          Blood Pressure from Last 3 Encounters:   08/20/18 137/88   07/16/18 134/78   06/26/18 123/83    Weight from Last 3 Encounters:   08/20/18 88.9 kg (196 lb)   06/26/18 86.2 kg (190 lb)   06/25/18 86.5 kg (190 lb 9.6 oz)               Primary Care Provider Office Phone # Fax #    Gabriel Spangler [phone]       [address] [facility] [address]        Equal Access to Services     EILEEN LOCKHART : Jeff maldonadoo Soreuben, waaxda luqadaha, qaybta kaalmada adeegyada, jenni viera. So [facility] [phone].    ATENCIÓN: Si habla español, tiene a fontenot disposición servicios gratuitos de asistencia lingüística. [facility] [phone].    We comply with applicable federal civil rights laws and [place] laws. We do not discriminate on the basis of race, color, national origin, age, disability, sex, sexual orientation, or gender identity.            Thank you!     Thank you for choosing [facility]\""  for your care. Our goal is always to provide you with excellent care. Hearing back from our patients is one way we can continue to improve our services. Please take a few minutes to complete the written survey that you may receive in the mail after your visit with us. Thank you!             Your Updated Medication List - Protect others around you: Learn how to safely use, store and throw away your medicines at www.disposemymeds.org.          This list is accurate as of 8/20/18 11:59 PM.  Always use your most recent med list.                   Brand Name Dispense Instructions for use Diagnosis    clarithromycin 500 MG tablet    BIAXIN    28 tablet    Take 1 tablet (500 mg) by mouth 2 times daily    Gastroesophageal reflux disease without esophagitis       ferrous gluconate 324 (38 Fe) " MG tablet    FERGON    100 tablet    Take 1 tablet (324 mg) by mouth daily (with breakfast)    Iron deficiency anemia due to chronic blood loss       metroNIDAZOLE 500 MG tablet    FLAGYL    40 tablet    Take 1 tablet (500 mg) by mouth 3 times daily    Gastroesophageal reflux disease without esophagitis       * omeprazole 20 MG tablet     30 tablet    TAKE 1 TABLET (20 MG) BY MOUTH DAILY 30-60 MINUTES BEFORE A MEAL.    Abdominal pain, epigastric       * omeprazole 20 MG CR capsule    priLOSEC    60 capsule    Take 1 capsule (20 mg) by mouth 2 times daily    Gastroesophageal reflux disease without esophagitis       prenatal multivitamin plus iron 27-0.8 MG Tabs per tablet      Take 1 tablet by mouth daily        VITAMIN D (CHOLECALCIFEROL) PO      Take by mouth daily        * Notice:  This list has 2 medication(s) that are the same as other medications prescribed for you. Read the directions carefully, and ask your doctor or other care provider to review them with you.

## 2018-08-28 NOTE — PROGRESS NOTES
Please send a letter:    Dear Pradeepsuzy Daily,    Vitamin D levels are improved and in a normal range.  Blood counts have also improved, though iron stores can improve further. I would recommend taking the iron supplements twice a day.  If you are continuing to have heavy periods, then we should get a Pelvic Ultrasound.   Please let me know if you have any questions and thank you for choosing [facility].    Regards,    Edith Fisher MD MPH

## 2018-10-01 DIAGNOSIS — D50.0 IRON DEFICIENCY ANEMIA DUE TO CHRONIC BLOOD LOSS: ICD-10-CM

## 2018-10-01 NOTE — TELEPHONE ENCOUNTER
"Requested Prescriptions   Pending Prescriptions Disp Refills     ferrous gluconate (FERGON) 324 (38 Fe) MG tablet [Pharmacy Med Name: FERROUS GLUCONATE 324 MG TAB]  Last Written Prescription Date:  06/26/18  Last Fill Quantity: 100,  # refills: 0   Last Office Visit with G, P or [facility] prescribing provider:  06/26/18Thai   Future Office Visit:    100 tablet 0     Sig: TAKE 1 TABLET BY MOUTH EVERY DAY WITH BREAKFAST    Iron Supplements Passed    10/1/2018  2:10 AM       Passed - Patient is 12 years of age or older       Passed - Recent (12 mo) or future (30 days) visit within the authorizing provider's specialty    Patient had office visit in the last 12 months or has a visit in the next 30 days with authorizing provider or within the authorizing provider's specialty.  See \"Patient Info\" tab in inbasket, or \"Choose Columns\" in Meds & Orders section of the refill encounter.           Passed - Hgb OR Hct on record within the past 12 mos.    Patient need only have had a HGB or HCT on file in the past 12 mos. That result does not need to be normal.    Recent Labs   Lab Test  08/13/18   0659  06/25/18 2048  12/06/16   1723   HGB  10.0*  8.2*  10.3*     Recent Labs   Lab Test  08/13/18   0659  06/25/18 2048  12/06/16   1723   HCT  31.7*  26.7*  32.5*       Please verify a HGB or HCT has been checked SINCE THE LAST DOSE CHANGE.              "

## 2018-10-02 RX ORDER — FERROUS GLUCONATE 324(38)MG
TABLET ORAL
Qty: 100 TABLET | Refills: 0 | Status: SHIPPED | OUTPATIENT
Start: 2018-10-02 | End: 2019-01-15

## 2018-10-02 NOTE — TELEPHONE ENCOUNTER
Prescription approved per [facility] Refill Protocol.    Najma López RN, BSN

## 2019-01-15 DIAGNOSIS — D50.0 IRON DEFICIENCY ANEMIA DUE TO CHRONIC BLOOD LOSS: ICD-10-CM

## 2019-01-15 RX ORDER — FERROUS GLUCONATE 324(38)MG
TABLET ORAL
Qty: 100 TABLET | Refills: 0 | Status: SHIPPED | OUTPATIENT
Start: 2019-01-15 | End: 2019-08-14

## 2019-01-15 NOTE — TELEPHONE ENCOUNTER
"Requested Prescriptions   Pending Prescriptions Disp Refills     ferrous gluconate (FERGON) 324 (38 Fe) MG tablet [Pharmacy Med Name: FERROUS GLUCONATE 324 MG TAB] 100 tablet 0      Last Written Prescription Date:  10/02/18  Last Fill Quantity: 100,  # refills: 0   Last Office Visit with G, P or [facility] prescribing provider:  06/26/18Thai   Future Office Visit:    Sig: TAKE 1 TABLET BY MOUTH EVERY DAY WITH BREAKFAST    Iron Supplements Passed - 1/15/2019  1:42 AM       Passed - Patient is 12 years of age or older       Passed - Recent (12 mo) or future (30 days) visit within the authorizing provider's specialty    Patient had office visit in the last 12 months or has a visit in the next 30 days with authorizing provider or within the authorizing provider's specialty.  See \"Patient Info\" tab in inbasket, or \"Choose Columns\" in Meds & Orders section of the refill encounter.             Passed - Hgb OR Hct on record within the past 12 mos.    Patient need only have had a HGB or HCT on file in the past 12 mos. That result does not need to be normal.    Recent Labs   Lab Test 08/13/18  0659 06/25/18 2048 12/06/16  1723   HGB 10.0* 8.2* 10.3*     Recent Labs   Lab Test 08/13/18  0659 06/25/18 2048 12/06/16  1723   HCT 31.7* 26.7* 32.5*       Please verify a HGB or HCT has been checked SINCE THE LAST DOSE CHANGE.           Passed - Medication is active on med list          "

## 2019-01-16 NOTE — TELEPHONE ENCOUNTER
Prescription approved per [facility] Refill Protocol.      Najma López RN, BSN

## 2019-08-12 ENCOUNTER — OFFICE VISIT (OUTPATIENT)
Dept: FAMILY MEDICINE | Facility: CLINIC | Age: 44
End: 2019-08-12
Payer: COMMERCIAL

## 2019-08-12 VITALS
BODY MASS INDEX: 35.77 KG/M2 | RESPIRATION RATE: 16 BRPM | TEMPERATURE: 98.7 F | HEIGHT: 66 IN | OXYGEN SATURATION: 100 % | WEIGHT: 222.6 LBS | DIASTOLIC BLOOD PRESSURE: 65 MMHG | HEART RATE: 77 BPM | SYSTOLIC BLOOD PRESSURE: 137 MMHG

## 2019-08-12 DIAGNOSIS — E66.811 OBESITY (BMI 30.0-34.9): ICD-10-CM

## 2019-08-12 DIAGNOSIS — E05.90 HYPERTHYROIDISM: ICD-10-CM

## 2019-08-12 DIAGNOSIS — D50.0 IRON DEFICIENCY ANEMIA DUE TO CHRONIC BLOOD LOSS: ICD-10-CM

## 2019-08-12 DIAGNOSIS — N92.4 EXCESSIVE BLEEDING IN PREMENOPAUSAL PERIOD: Primary | ICD-10-CM

## 2019-08-12 LAB
ERYTHROCYTE [DISTWIDTH] IN BLOOD BY AUTOMATED COUNT: 15 % (ref 10–15)
FERRITIN SERPL-MCNC: 5 NG/ML (ref 8–252)
HCG UR QL: NEGATIVE
HCT VFR BLD AUTO: 30.7 % (ref 35–47)
HGB BLD-MCNC: 9.9 G/DL (ref 11.7–15.7)
MCH RBC QN AUTO: 26.6 PG (ref 26.5–33)
MCHC RBC AUTO-ENTMCNC: 32.2 G/DL (ref 31.5–36.5)
MCV RBC AUTO: 83 FL (ref 78–100)
PLATELET # BLD AUTO: 297 10E9/L (ref 150–450)
RBC # BLD AUTO: 3.72 10E12/L (ref 3.8–5.2)
T4 FREE SERPL-MCNC: 1.33 NG/DL (ref 0.76–1.46)
TSH SERPL DL<=0.005 MIU/L-ACNC: 0.28 MU/L (ref 0.4–4)
WBC # BLD AUTO: 6.2 10E9/L (ref 4–11)

## 2019-08-12 PROCEDURE — 85027 COMPLETE CBC AUTOMATED: CPT | Performed by: NURSE PRACTITIONER

## 2019-08-12 PROCEDURE — 84443 ASSAY THYROID STIM HORMONE: CPT | Performed by: NURSE PRACTITIONER

## 2019-08-12 PROCEDURE — 84439 ASSAY OF FREE THYROXINE: CPT | Performed by: NURSE PRACTITIONER

## 2019-08-12 PROCEDURE — 99214 OFFICE O/P EST MOD 30 MIN: CPT | Performed by: NURSE PRACTITIONER

## 2019-08-12 PROCEDURE — 82728 ASSAY OF FERRITIN: CPT | Performed by: NURSE PRACTITIONER

## 2019-08-12 PROCEDURE — 81025 URINE PREGNANCY TEST: CPT | Performed by: NURSE PRACTITIONER

## 2019-08-12 PROCEDURE — 36415 COLL VENOUS BLD VENIPUNCTURE: CPT | Performed by: NURSE PRACTITIONER

## 2019-08-12 ASSESSMENT — PAIN SCALES - GENERAL: PAINLEVEL: NO PAIN (0)

## 2019-08-12 ASSESSMENT — MIFFLIN-ST. JEOR: SCORE: 1671.46

## 2019-08-12 NOTE — LETTER
August 14, 2019      Stuart Daily  [address] [place] [address]    Hi Aunty,     Your iron stores remain low.  I want you to get back on the iron tablet daily and be sure to be consuming a high iron diet including such foods as cream of wheat, prunes, prune juice, almonds, dried beans, dried fruits, lean meats, etc. Take the iron with some vitamin C to help with absorption of the iron as well.       Your TSH remains low but is not significantly changed from your last check.  We should continue to check this every 6 months.     Let me know if you continue to have symptoms.     Pregnancy test was negative.     Feel free to contact me with any questions or concerns.  Thank you for allowing me to participate in your care.         Kylie ULLOA, CNP/smj    Resulted Orders   CBC with platelets   Result Value Ref Range    WBC 6.2 4.0 - 11.0 10e9/L    RBC Count 3.72 (L) 3.8 - 5.2 10e12/L    Hemoglobin 9.9 (L) 11.7 - 15.7 g/dL    Hematocrit 30.7 (L) 35.0 - 47.0 %    MCV 83 78 - 100 fl    MCH 26.6 26.5 - 33.0 pg    MCHC 32.2 31.5 - 36.5 g/dL    RDW 15.0 10.0 - 15.0 %    Platelet Count 297 150 - 450 10e9/L   Ferritin   Result Value Ref Range    Ferritin 5 (L) 8 - 252 ng/mL   HCG Qual, Urine ([id number])   Result Value Ref Range    HCG Qual Urine Negative NEG^Negative      Comment:      This test is for screening purposes.  Results should be interpreted along with   the clinical picture.  Confirmation testing is available if warranted by   ordering [id number], HCG Quantitative Pregnancy.     TSH with free T4 reflex   Result Value Ref Range    TSH 0.28 (L) 0.40 - 4.00 mU/L   T4 free   Result Value Ref Range    T4 Free 1.33 0.76 - 1.46 ng/dL

## 2019-08-12 NOTE — PROGRESS NOTES
Subjective     Aunelza Daily is a 44 year old female who presents to clinic today for the following health issues:    HPI   Vaginal Bleeding (Dysmenorrhea)  Onset: 7/26/19    Description:   Duration of bleeding episodes: none  Frequency between periods:  21-28 days  Describe bleeding/flow:   Clots: YES  Number of pads/hour: 1 initially, now she is spotting (1-2 pads/day)  Cramping: in the beginning, but now none    Accompanying Signs & Symptoms:  Weakness: no   Lightheadedness: YES  Hot flashes: no   Nosebleeds/Easy bruising: no   Vaginal Discharge: no     History:  Patient's last menstrual period was 07/26/2019 (exact date).  Previous normal periods: no   Contraceptive use: NO  Possibility of Pregnancy: no   Any bleeding after intercourse: no   Age of first period (menarche): 11  Abnormal PAP Smears: no     Precipitating factors:   none    Alleviating factors:  Rest     Therapies Tried and outcome: none  Patient has normal cycle 5/107/19, missed June cycle and began bleeding heavily on 7/26/19.  Initially she was changing pad every hour but now is spotting- changes pad 1-2 times/day.   No vaginal itching, burning, odor, no urinary frequency, urgency, dysuria.  PMH significant for hyperthyroidism during her last pregnancy (not on medication).     Iron deficiency anemia- due to chronic blood loss, not currently taking iron supplement. She complains of fatigue and intermittent dizziness with rapid position changes.    Patient Active Problem List   Diagnosis     Hyperthyroidism     Vitamin D insufficiency     Obesity (BMI 30.0-34.9)     H. pylori infection     Past Surgical History:   Procedure Laterality Date     COMBINED ESOPHAGOSCOPY, GASTROSCOPY, DUODENOSCOPY (EGD) WITH CO2 INSUFFLATION N/A 7/16/2018    Procedure: COMBINED ESOPHAGOSCOPY, GASTROSCOPY, DUODENOSCOPY (EGD) WITH CO2 INSUFFLATION;  EGD-ABDOMINAL PAIN. EPIGASTRIC HISTORY OF HELICOBACTER PYLORI INFECTION. JORGITO;  Surgeon: Dustin Troncoso DO;   Location: [facility] OR     ESOPHAGOSCOPY, GASTROSCOPY, DUODENOSCOPY (EGD), COMBINED N/A 7/16/2018    Procedure: COMBINED ESOPHAGOSCOPY, GASTROSCOPY, DUODENOSCOPY (EGD), BIOPSY SINGLE OR MULTIPLE;;  Surgeon: Dustin Troncoso DO;  Location: [facility] OR     NO HISTORY OF SURGERY         Social History     Tobacco Use     Smoking status: Never Smoker     Smokeless tobacco: Never Used   Substance Use Topics     Alcohol use: No     Family History   Problem Relation Age of Onset     Diabetes Mother      Hypertension Mother      Lipids Mother      Cerebrovascular Disease Maternal Grandmother      Neurologic Disorder Father         migraine     Family History Negative No family hx of      Cancer No family hx of      Thyroid Disease No family hx of      Glaucoma No family hx of      Macular Degeneration No family hx of      Asthma No family hx of      C.A.D. No family hx of      Endocrine Disease No family hx of      Heart Disease No family hx of      Psychotic Disorder No family hx of      Blood Disease No family hx of      Gastrointestinal Disease No family hx of          Current Outpatient Medications   Medication Sig Dispense Refill     clarithromycin (BIAXIN) 500 MG tablet Take 1 tablet (500 mg) by mouth 2 times daily (Patient not taking: Reported on 8/20/2018) 28 tablet 0     ferrous gluconate (FERGON) 324 (38 Fe) MG tablet TAKE 1 TABLET BY MOUTH EVERY DAY WITH BREAKFAST (Patient not taking: Reported on 8/12/2019) 100 tablet 0     metroNIDAZOLE (FLAGYL) 500 MG tablet Take 1 tablet (500 mg) by mouth 3 times daily (Patient not taking: Reported on 8/20/2018) 40 tablet 0     omeprazole (PRILOSEC) 20 MG CR capsule Take 1 capsule (20 mg) by mouth 2 times daily (Patient not taking: Reported on 8/12/2019) 60 capsule 1     omeprazole 20 MG tablet TAKE 1 TABLET (20 MG) BY MOUTH DAILY 30-60 MINUTES BEFORE A MEAL. (Patient not taking: Reported on 8/12/2019) 30 tablet 2     Prenatal Vit-Fe Fumarate-FA (PRENATAL MULTIVITAMIN PLUS IRON) 27-0.8 MG  "TABS per tablet Take 1 tablet by mouth daily       VITAMIN D, CHOLECALCIFEROL, PO Take by mouth daily       BP Readings from Last 3 Encounters:   08/12/19 137/65   08/20/18 137/88   07/16/18 134/78    Wt Readings from Last 3 Encounters:   08/12/19 101 kg (222 lb 9.6 oz)   08/20/18 88.9 kg (196 lb)   06/26/18 86.2 kg (190 lb)                  Reviewed and updated as needed this visit by Provider         Review of Systems   ROS COMP: Constitutional, HEENT, cardiovascular, pulmonary, gi and gu systems are negative, except as otherwise noted.      Objective    /65 (BP Location: Right arm, Patient Position: Chair, Cuff Size: Adult Large)   Pulse 77   Temp 98.7  F (37.1  C) (Oral)   Resp 16   Ht 1.676 m (5' 6\")   Wt 101 kg (222 lb 9.6 oz)   LMP 07/26/2019 (Exact Date)   SpO2 100%   BMI 35.93 kg/m    Body mass index is 35.93 kg/m .  Physical Exam   GENERAL: healthy, alert and no distress  EYES: Eyes grossly normal to inspection, PERRL and conjunctivae and sclerae normal  HENT: ear canals and TM's normal, nose and mouth without ulcers or lesions  NECK: no adenopathy, no asymmetry, masses, or scars and thyroid normal to palpation  RESP: lungs clear to auscultation - no rales, rhonchi or wheezes  CV: regular rate and rhythm, normal S1 S2, no S3 or S4, no murmur, click or rub, no peripheral edema and peripheral pulses strong  ABDOMEN: soft, nontender, no hepatosplenomegaly, no masses and bowel sounds normal  MS: no gross musculoskeletal defects noted, no edema  SKIN: no suspicious lesions or rashes  NEURO: Normal strength and tone, mentation intact and speech normal  BACK: no CVA tenderness, no paralumbar tenderness  PSYCH: mentation appears normal, affect normal/bright  LYMPH: normal ant/post cervical, supraclavicular nodes    Diagnostic Test Results:  Labs reviewed in Epic  Results for orders placed or performed in visit on 08/12/19 (from the past 24 hour(s))   HCG Qual, Urine ([id number])   Result Value Ref Range " "   HCG Qual Urine Negative NEG^Negative           Assessment & Plan     1. Excessive bleeding in premenopausal period  Checking labs, consider pelvic US. She is only spotting now so no need for medication to stop her bleeding.  - CBC with platelets  - Ferritin  - HCG Qual, Urine ([id number])    2. Iron deficiency anemia due to chronic blood loss  Checking labs, supplement as indicated, high iron diet reviewed.  - CBC with platelets  - Ferritin    3. Hyperthyroidism  Checking TSH  - TSH with free T4 reflex    4. Obesity (BMI 30.0-34.9)  Benefits of weight loss reviewed in detail, encouraged her to cut back on the carbohydrates in the diet, consume more fruits and vegetables, drink plenty of water, avoid fruit juices, sodas, get 150 min moderate exercise/week.  Recheck weight in 6 months.         BMI:   Estimated body mass index is 35.93 kg/m  as calculated from the following:    Height as of this encounter: 1.676 m (5' 6\").    Weight as of this encounter: 101 kg (222 lb 9.6 oz).   Weight management plan: Discussed healthy diet and exercise guidelines        Work on weight loss  Regular exercise  See Patient Instructions    Return in about 2 weeks (around 8/26/2019), or if symptoms worsen or fail to improve, for Routine Visit.    ARTEMIO [facility]        "

## 2019-08-12 NOTE — PATIENT INSTRUCTIONS
At [facility], we strive to deliver an exceptional experience to you, every time we see you.  If you receive a survey in the mail, please send us back your thoughts. We really do value your feedback.    Based on your medical history, these are the current health maintenance/preventive care services that you are due for (some may have been done at this visit.)  Health Maintenance Due   Topic Date Due     PREVENTIVE CARE VISIT  12/22/2018     PHQ-2  01/01/2019         Suggested websites for health information:  Www.EarDish.org : Up to date and easily searchable information on multiple topics.  Www.Clustrix.gov : medication info, interactive tutorials, watch real surgeries online  Www.familydoctor.org : good info from the Academy of Family Physicians  Www.cdc.gov : public health info, travel advisories, epidemics (H1N1)  Www.aap.org : children's health info, normal development, vaccinations  Www.health.[facility].[place].us : [place] dept of health, public health issues in [place], N1N1    Your care team:                            Family Medicine Internal Medicine   MD Javon Lombardo MD Shantel Branch-Fleming, MD Katya Georgiev PA-C Nam Ho, MD Pediatrics   MUMTAZ Felix, MD Ashley Peterson CNP, MD Deborah Mielke, MD Kim Thein, APRN [facility]      Clinic hours: Monday - Thursday 7 am-7 pm; Fridays 7 am-5 pm.   Urgent care: Monday - Friday 11 am-9 pm; Saturday and Sunday 9 am-5 pm.  Pharmacy : Monday -Thursday 8 am-8 pm; Friday 8 am-6 pm; Saturday and Sunday 9 am-5 pm.     Clinic: [phone]   Pharmacy: [phone]    Patient Education     Iron-Deficiency Anemia (Adult)  Red blood cells carry oxygen to the tissues of your body. Anemia is a condition in which you have too few red blood cells. You need iron to make red cells. Anemia makes you feel tired and run down. When anemia becomes severe, your skin becomes pale. You may  feel short of breath after physical activity. Other symptoms include:    Headaches    Dizziness    Leg cramps with physical activity    Drowsiness    Restless legs  Your anemia is caused by not having enough iron in your body. This may be because of:    Loss of blood. This can be caused by heavy menstrual periods. It can also be caused by bleeding from the stomach or intestines.    Poor diet. You may not be eating enough foods that contain iron.    Inability to absorb iron from the foods you eat    Pregnancy  If your blood count is low enough, your healthcare provider may prescribe an iron supplement. It usually takes about 2 to 3 months of treatment with iron supplements to correct anemia. Severe cases of anemia need a blood transfusion to quickly ease symptoms and deliver more oxygen to the cells.  Home care  Follow these guidelines when caring for yourself at home:    Eat foods high in iron. This will boost the amount of iron stored in your body. It is a natural way to build up the number of blood cells. Good sources of iron include beef, liver, spinach and other dark green leafy vegetables, whole grains, beans, and nuts.    Don't overexert yourself.    Talk with your healthcare provider before traveling by air or traveling to high altitudes.  Follow-up care  Follow up with your healthcare provider in 2 months, or as advised. This is to have another red blood cell count to be sure your anemia has been fixed.  Call 911  Call 911 or seek immediate medical care if any of these occur:    Shortness of breath or chest pain    Dizziness or fainting    Vomiting blood or passing red or black-colored stool   Date Last Reviewed: 3/1/2018    1924-1516 [employer]. [address] [place] [address]. All rights reserved. This information is not intended as a substitute for professional medical care. Always follow your healthcare professional's instructions.           Patient Education     Dysfunctional  Uterine Bleeding    Dysfunctional uterine bleeding, also called abnormal uterine bleeding, is a condition in which bleeding is abnormal and occurs at unexpected times of the month. This happens because of changes in the hormones that help control a woman s menstrual cycle each month.  The bleeding may be heavier or lighter than normal. If you have heavy bleeding often, this can lead to a problem called anemia. With anemia, your red blood cell count is too low. Red blood cells help carry oxygen throughout your body. Severe anemia may cause you to look pale and feel very weak or tired. You might also become short of breath easily.  To treat dysfunctional uterine bleeding, medicines are often tried first. If these don t help, or if you have additional symptoms or have reached menopause, further testing and treatments may be needed. Discuss all of your options with your provider.  Home care  Medicines  If you re prescribed medicines, be sure to take them as directed. Some of the more common medicines you may be prescribed include:    Hormone therapy (Options include most methods of hormonal birth control such as pills, shots, or a hormone-releasing IUD)    Nonsteroidal anti-inflammatory drugs (NSAIDs), such as ibuprofen    Iron supplements, if you have anemia     General care    Get plenty of rest if you tire easily. Avoid heavy exertion.    To help relieve pain or cramping that may occur with bleeding, try using a heating pad on the lower belly or back. A warm bath may also help.  Follow-up care  Follow up with your healthcare provider, or as directed.  When to seek medical advice  Call your healthcare provider right away if:    Bleeding becomes heavy (soaking 1 pad or tampon every hour for 3 hours)    Increased abdominal pain    Irregular bleeding worsens or does not get better even with treatment    Fever of 100.4 F (38 C) or higher, or as directed by your provider    Signs of anemia, such as pale skin, extreme  fatigue or weakness, or shortness of breath    Dizziness or fainting   Date Last Reviewed: 11/1/2017 2000-2018 [employer]. [address] [place] [address], [place] [address]. All rights reserved. This information is not intended as a substitute for professional medical care. Always follow your healthcare professional's instructions.

## 2019-08-14 DIAGNOSIS — D50.0 IRON DEFICIENCY ANEMIA DUE TO CHRONIC BLOOD LOSS: ICD-10-CM

## 2019-08-14 RX ORDER — FERROUS GLUCONATE 324(38)MG
TABLET ORAL
Qty: 100 TABLET | Refills: 0 | Status: SHIPPED | OUTPATIENT
Start: 2019-08-14 | End: 2020-01-02

## 2019-08-15 ENCOUNTER — TELEPHONE (OUTPATIENT)
Dept: FAMILY MEDICINE | Facility: CLINIC | Age: 44
End: 2019-08-15

## 2019-08-15 NOTE — TELEPHONE ENCOUNTER
This writer attempted to contact Pradeep on 08/15/19      Reason for call results and left message. You will be getting results in the mail. Can wait for results in mail or call back and speak to the nurse regarding your results #[phone].       If patient calls back:   Registered Nurse called. Follow Triage Call workflow        Desiree Robles RN

## 2019-08-15 NOTE — TELEPHONE ENCOUNTER
Reason for Call:  Request for results:    Name of test or procedure: Blood test    Date of test of procedure: 8/12    Location of the test or procedure: [facility]    OK to leave the result message on voice mail or with a family member? YES    Phone number Patient can be reached at:  Cell number on file:    Telephone Information:   Mobile [phone]     Additional comments: any    Call taken on 8/15/2019 at 2:44 PM by Chelsey Louise

## 2019-08-15 NOTE — TELEPHONE ENCOUNTER
Patient called back and was informed of the below per provider documentation. Patient verbalizes understanding.     Desiree Robles RN

## 2019-08-15 NOTE — TELEPHONE ENCOUNTER
Reason for Call:  Other Results    Detailed comments: Pt returning phone call regarding results and was transferred to Triage    Phone Number Patient can be reached at: Home number on file [phone] (home)    Best Time: anytime    Can we leave a detailed message on this number? YES    Call taken on 8/15/2019 at 3:19 PM by Carlos Correia

## 2019-08-15 NOTE — TELEPHONE ENCOUNTER
Notes recorded by Iris Brian MA on 8/14/2019 at 6:41 PM CDT  Letter sent.  Jesse SPARKS    ------    Notes recorded by Kylie Nesbitt APRN CNP on 8/14/2019 at 6:34 PM CDT  Hi Aunty,    Your iron stores remain low.  I want you to get back on the iron tablet daily and be sure to be consuming a high iron diet including such foods as cream of wheat, prunes, prune juice, almonds, dried beans, dried fruits, lean meats, etc. Take the iron with some vitamin C to help with absorption of the iron as well.      Your TSH remains low but is not significantly changed from your last check.  We should continue to check this every 6 months.    Let me know if you continue to have symptoms.     Pregnancy test was negative.    Feel free to contact me with any questions or concerns.  Thank you for allowing me to participate in your care.        Kylie ULLOA, CNP

## 2019-12-27 DIAGNOSIS — D50.0 IRON DEFICIENCY ANEMIA DUE TO CHRONIC BLOOD LOSS: ICD-10-CM

## 2019-12-28 NOTE — TELEPHONE ENCOUNTER
"Requested Prescriptions   Pending Prescriptions Disp Refills     ferrous gluconate (FERGON) 324 (38 Fe) MG tablet [Pharmacy Med Name: FERROUS GLUCONATE 324 MG TAB] 100 tablet 0     Sig: TAKE 1 TABLET BY MOUTH EVERY DAY WITH BREAKFAST         Last Written Prescription Date:  8/14/19  Last Fill Quantity: 100,  # refills: 0   Last Office Visit with [facility], [facility] or [facility] prescribing provider:  8/12/19   Future Office Visit:         Iron Supplements Passed - 12/27/2019  6:52 PM        Passed - Patient is 12 years of age or older        Passed - Recent (12 mo) or future (30 days) visit within the authorizing provider's specialty     Patient has had an office visit with the authorizing provider or a provider within the authorizing providers department within the previous 12 mos or has a future within next 30 days. See \"Patient Info\" tab in inbasket, or \"Choose Columns\" in Meds & Orders section of the refill encounter.              Passed - Hgb OR Hct on record within the past 12 mos.     Patient need only have had a HGB or HCT on file in the past 12 mos. That result does not need to be normal.    Recent Labs   Lab Test 08/12/19  1054 08/13/18  0659 06/25/18  2048   HGB 9.9* 10.0* 8.2*     Recent Labs   Lab Test 08/12/19  1054 08/13/18  0659 06/25/18 2048   HCT 30.7* 31.7* 26.7*       Please verify a HGB or HCT has been checked SINCE THE LAST DOSE CHANGE.            Passed - Medication is active on med list              Fred Faarax  [facility]  "

## 2020-01-02 RX ORDER — FERROUS GLUCONATE 324(38)MG
TABLET ORAL
Qty: 100 TABLET | Refills: 0 | Status: SHIPPED | OUTPATIENT
Start: 2020-01-02 | End: 2020-04-03

## 2020-01-23 ENCOUNTER — OFFICE VISIT (OUTPATIENT)
Dept: FAMILY MEDICINE | Facility: CLINIC | Age: 45
End: 2020-01-23
Payer: COMMERCIAL

## 2020-01-23 VITALS
OXYGEN SATURATION: 97 % | HEIGHT: 66 IN | SYSTOLIC BLOOD PRESSURE: 125 MMHG | DIASTOLIC BLOOD PRESSURE: 81 MMHG | TEMPERATURE: 98.5 F | WEIGHT: 215 LBS | RESPIRATION RATE: 16 BRPM | BODY MASS INDEX: 34.55 KG/M2 | HEART RATE: 92 BPM

## 2020-01-23 DIAGNOSIS — M54.2 NECK PAIN: Primary | ICD-10-CM

## 2020-01-23 PROCEDURE — 90715 TDAP VACCINE 7 YRS/> IM: CPT | Performed by: PHYSICIAN ASSISTANT

## 2020-01-23 PROCEDURE — 90471 IMMUNIZATION ADMIN: CPT | Performed by: PHYSICIAN ASSISTANT

## 2020-01-23 PROCEDURE — 99213 OFFICE O/P EST LOW 20 MIN: CPT | Performed by: PHYSICIAN ASSISTANT

## 2020-01-23 ASSESSMENT — PAIN SCALES - GENERAL: PAINLEVEL: MODERATE PAIN (5)

## 2020-01-23 ASSESSMENT — MIFFLIN-ST. JEOR: SCORE: 1636.98

## 2020-01-23 NOTE — PATIENT INSTRUCTIONS
At [facility], we strive to deliver an exceptional experience to you, every time we see you.  If you receive a survey in the mail, please send us back your thoughts. We really do value your feedback.    Based on your medical history, these are the current health maintenance/preventive care services that you are due for (some may have been done at this visit.)  Health Maintenance Due   Topic Date Due     PREVENTIVE CARE VISIT  12/22/2018     INFLUENZA VACCINE (1) 09/01/2019     DTAP/TDAP/TD IMMUNIZATION (2 - Td) 12/10/2019     PHQ-2  01/01/2020         Suggested websites for health information:  Www.[facility].com.org : Up to date and easily searchable information on multiple topics.  Www.medlineplus.gov : medication info, interactive tutorials, watch real surgeries online  Www.familydoctor.org : good info from the Academy of Family Physicians  Www.cdc.gov : public health info, travel advisories, epidemics (H1N1)  Www.aap.org : children's health info, normal development, vaccinations  Www.health.[facility].[place].us : [place] dept of health, public health issues in [place], N1N1    Your care team:                            Family Medicine Internal Medicine   MD Javon Lombardo MD Shantel Branch-Fleming, MD Katya Georgiev PA-C Nam Ho, MD Pediatrics   MUMTAZ Felix, MD Ashley Peterson CNP, MD Deborah Mielke, MD Kim Thein, APRN [facility]      Clinic hours: Monday - Thursday 7 am-7 pm; Fridays 7 am-5 pm.   Urgent care: Monday - Friday 11 am-9 pm; Saturday and Sunday 9 am-5 pm.  Pharmacy : Monday -Thursday 8 am-8 pm; Friday 8 am-6 pm; Saturday and Sunday 9 am-5 pm.     Clinic: [phone]   Pharmacy: [phone]    Apply ice for 24 hours then alternate heat or ice, which ever feels better. Return to clinic or Emergency Deptartment for uncontrolled pain, chest pain, shortness of breath, fever, numbness/tingling in extremities, inability  to move neck. incontinence or urinary problems.     Neck Sprain Or Strain  A sudden force that causes turning or bending of the neck (such as in a car accident) can stretch or tear muscles (strain) and ligaments (sprain) and cause neck pain. Sometimes neck pain occurs after a simple awkward movement. In either case, muscle spasm is commonly present and contributes to the pain    Unless you had a forceful physical injury (for example, a car accident or fall), X-rays are usually not ordered for the initial evaluation of neck pain. If pain continues and dose not respond to medical treatment, x-rays and other tests may be performed at a later time.  Home Care:  1) You may feel more soreness and spasm the first few days after the injury. Reduce your activity level until symptoms begin to improve.  2) When lying down, use a comfortable pillow that supports the head and keeps the spine in a neutral position. The position of the head should not be tilted forward or backward.  3) Use ice packs (ice in a plastic bag, wrapped in a towel) to treat acute pain. Apply for 20 minutes every 2-4 hours during the first two days. Then, begin local heat (hot shower, hot bath or heating pad) and massage to reduce muscle spasm. Some patients feel best alternating hot and cold treatments, or just staying with one method only. Do what feels the best to you and gives the most relief.  4) You may use acetaminophen (Tylenol) or ibuprofen (Motrin, Advil) to control pain, unless another pain medicine was prescribed. [ NOTE : If you have chronic liver or kidney disease or ever had a stomach ulcer or GI bleeding, talk with your doctor before using these medicines.]  Follow Up  with your physician or this facility if your symptoms do not show signs of improvement after one week. Physical therapy may be needed.  [NOTE: A radiologist will review any X-rays or CT scans that were taken. We will notify you of any new findings that may affect your  care.]  Get Prompt Medical Attention  if any of the following occur:  -- Pain becomes worse or spreads into your arms  -- Weakness or numbness in one or both arms    1502-1561 [employer], [address] [place] [address], [place] [address]. All rights reserved. This information is not intended as a substitute for professional medical care. Always follow your healthcare professional's instructions.

## 2020-01-23 NOTE — NURSING NOTE
Prior to immunization administration, verified patients identity using patient s name and date of birth. Please see Immunization Activity for additional information.     Screening Questionnaire for Adult Immunization    Are you sick today?   No   Do you have allergies to medications, food, a vaccine component or latex?   No   Have you ever had a serious reaction after receiving a vaccination?   No   Do you have a long-term health problem with heart, lung, kidney, or metabolic disease (e.g., diabetes), asthma, a blood disorder, no spleen, complement component deficiency, a cochlear implant, or a spinal fluid leak?  Are you on long-term aspirin therapy?   No   Do you have cancer, leukemia, HIV/AIDS, or any other immune system problem?   No   Do you have a parent, brother, or sister with an immune system problem?   No   In the past 3 months, have you taken medications that affect  your immune system, such as prednisone, other steroids, or anticancer drugs; drugs for the treatment of rheumatoid arthritis, Crohn s disease, or psoriasis; or have you had radiation treatments?   No   Have you had a seizure, or a brain or other nervous system problem?   No   During the past year, have you received a transfusion of blood or blood    products, or been given immune (gamma) globulin or antiviral drug?   No   For women: Are you pregnant or is there a chance you could become       pregnant during the next month?   No   Have you received any vaccinations in the past 4 weeks?   No     Immunization questionnaire answers were all negative.        Patient instructed to remain in clinic for 15 minutes afterwards, and to report any adverse reaction to me immediately.       Screening performed by Slim Jane MA on 1/23/2020 at 9:42 AM.
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
## 2020-04-03 DIAGNOSIS — D50.0 IRON DEFICIENCY ANEMIA DUE TO CHRONIC BLOOD LOSS: ICD-10-CM

## 2020-04-03 RX ORDER — FERROUS GLUCONATE 324(38)MG
TABLET ORAL
Qty: 100 TABLET | Refills: 2 | Status: SHIPPED | OUTPATIENT
Start: 2020-04-03 | End: 2021-11-12

## 2020-04-03 NOTE — TELEPHONE ENCOUNTER
"Requested Prescriptions   Pending Prescriptions Disp Refills     ferrous gluconate (FERGON) 324 (38 Fe) MG tablet [Pharmacy Med Name: FERROUS GLUCONATE 324 MG TAB]  Last Written Prescription Date:  01/02/2020  Last Fill Quantity: 100,  # refills: 0   Last Office Visit with [facility], [facility] or [facility] prescribing provider:  01/23/2020Tyrese   Future Office Visit:    100 tablet 0     Sig: TAKE 1 TABLET BY MOUTH EVERY DAY WITH BREAKFAST       Iron Supplements Passed - 4/3/2020  8:52 AM        Passed - Patient is 12 years of age or older        Passed - Recent (12 mo) or future (30 days) visit within the authorizing provider's specialty     Patient has had an office visit with the authorizing provider or a provider within the authorizing providers department within the previous 12 mos or has a future within next 30 days. See \"Patient Info\" tab in inbasket, or \"Choose Columns\" in Meds & Orders section of the refill encounter.              Passed - Hgb OR Hct on record within the past 12 mos.     Patient need only have had a HGB or HCT on file in the past 12 mos. That result does not need to be normal.    Recent Labs   Lab Test 08/12/19  1054 08/13/18  0659 06/25/18 2048   HGB 9.9* 10.0* 8.2*     Recent Labs   Lab Test 08/12/19  1054 08/13/18  0659 06/25/18 2048   HCT 30.7* 31.7* 26.7*       Please verify a HGB or HCT has been checked SINCE THE LAST DOSE CHANGE.            Passed - Medication is active on med list             "

## 2020-04-03 NOTE — TELEPHONE ENCOUNTER
Prescription approved per [facility] Refill Protocol.  Blanca Vieyra RN

## 2021-03-01 ENCOUNTER — ANCILLARY PROCEDURE (OUTPATIENT)
Dept: GENERAL RADIOLOGY | Facility: CLINIC | Age: 46
End: 2021-03-01
Attending: NURSE PRACTITIONER
Payer: COMMERCIAL

## 2021-03-01 ENCOUNTER — OFFICE VISIT (OUTPATIENT)
Dept: URGENT CARE | Facility: URGENT CARE | Age: 46
End: 2021-03-01
Payer: OTHER MISCELLANEOUS

## 2021-03-01 VITALS
OXYGEN SATURATION: 98 % | DIASTOLIC BLOOD PRESSURE: 97 MMHG | BODY MASS INDEX: 34.44 KG/M2 | SYSTOLIC BLOOD PRESSURE: 161 MMHG | RESPIRATION RATE: 16 BRPM | HEART RATE: 72 BPM | TEMPERATURE: 98.2 F | WEIGHT: 213.4 LBS

## 2021-03-01 DIAGNOSIS — S63.502A WRIST SPRAIN, LEFT, INITIAL ENCOUNTER: Primary | ICD-10-CM

## 2021-03-01 DIAGNOSIS — M25.532 LEFT WRIST PAIN: ICD-10-CM

## 2021-03-01 DIAGNOSIS — M79.642 PAIN OF LEFT HAND: ICD-10-CM
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
## 2021-03-01 PROCEDURE — 73130 X-RAY EXAM OF HAND: CPT | Mod: LT | Performed by: RADIOLOGY

## 2021-03-01 PROCEDURE — 99214 OFFICE O/P EST MOD 30 MIN: CPT | Performed by: NURSE PRACTITIONER

## 2021-03-01 PROCEDURE — 73110 X-RAY EXAM OF WRIST: CPT | Mod: LT | Performed by: RADIOLOGY

## 2021-03-01 RX ORDER — ACETAMINOPHEN 500 MG
1000 TABLET ORAL ONCE
Status: COMPLETED | OUTPATIENT
Start: 2021-03-01 | End: 2021-03-01

## 2021-03-01 RX ADMIN — Medication 1000 MG: at 18:25

## 2021-03-01 ASSESSMENT — PAIN SCALES - GENERAL: PAINLEVEL: MODERATE PAIN (5)

## 2021-03-02 NOTE — PROGRESS NOTES
Chief Complaint   Patient presents with     Work Comp     Work comp left wrist- there was ice and paitient slipped and fell onto her left side, hip pain. and wrist pain. Pain 5/10.         ICD-10-CM    1. Wrist sprain, left, initial encounter  S63.502A    2. Left wrist pain  M25.532 XR Wrist Left G/E 3 Views     XR Hand Left G/E 3 Views     acetaminophen (TYLENOL) tablet 1,000 mg   3. Fall due to slipping on ice or snow, initial encounter  W00.9XXA XR Wrist Left G/E 3 Views     XR Hand Left G/E 3 Views   4. Pain of left hand  M79.642 XR Hand Left G/E 3 Views     acetaminophen (TYLENOL) tablet 1,000 mg   Advised patient to use Tylenol as needed for discomfort as well as ice.  Return in 2 weeks if symptoms are not improving or worsening for repeat x-ray    Medical Decision Making    Differential Diagnosis:  MS Injury Pain: sprain, fracture, tendonitis, muscle strain, contusion, dislocation and osteoarthritis    Xray - Reviewed and interpreted by me.  Left hand and wrist x-rays show no acute fractures or dislocations.    Results for orders placed or performed in visit on 03/01/21 (from the past 24 hour(s))   XR Hand Left G/E 3 Views    Narrative    XR HAND LT G/E 3 VW 3/1/2021 6:36 PM     HISTORY: Left wrist pain; Fall due to slipping on ice or snow, initial  encounter; Pain of left hand    COMPARISON: None.      Impression    IMPRESSION: Soft tissue swelling adjacent to the middle finger PIP  joint. No acute fractures are evident. Deformity of the distal aspect  of the middle finger proximal phalanx could be due to sequela from  prior injury or osteochondroma.    TRISTON OLIVARES MD   XR Wrist Left G/E 3 Views    Narrative    XR WRIST LEFT G/E 3 VIEWS 3/1/2021 6:37 PM     HISTORY: Left wrist pain; Fall due to slipping on ice or snow, initial  encounter    COMPARISON: None.      Impression    IMPRESSION: Diffuse soft tissue swelling. No fractures are evident.  Joint spacing and alignment.    TRISTON OLIVARES MD        Subjective     Aunelza Daily is an 45 year oldfemale who presents to clinic today for left wrist/hand pain that started after she slipped at work today on ice.  She has not tried any treatments.      ROS: 10 point ROS neg other than the symptoms noted above in the HPI.       Objective    BP (!) 161/97   Pulse 72   Temp 98.2  F (36.8  C) (Tympanic)   Resp 16   Wt 96.8 kg (213 lb 6.4 oz)   SpO2 98%   BMI 34.44 kg/m      Physical Exam         GENERAL APPEARANCE: healthy appearing, alert     MS: All extremities normal except left wrist and hand she demonstrates tenderness over the radial head and the first metacarpal.  No ecchymosis or edema is noted.     SKIN: no suspicious lesions or rashes     NEURO: Normal strength and tone, mentation intact and speech normal    Patient Instructions   Ice 20 minutes a day for the next few days.    Wear splint until it is feeling better.    Acetaminophen (Tylenol) may be taken up to 4000mg daily (3000mg if over 65) which would be 2 regular strength tablets (325mg) or two extra strength tablets(500mg) up to 4 times a day (3 times a day if over 65).   Check for acetaminophen in other OTC or prescription medications and be sure you add this in the maximum amount you take every day.    Too much acetaminophen can lead to serious liver damage. DO NOT TAKE if you have a history of liver disease.      Patient Education     Self-Care for Strains and Sprains  Most minor strains and sprains can be treated with self-care. Recovering from a strain or sprain may take 6 to 8 weeks. Your self-care goal is to reduce pain and immobilize the injury to speed healing.   Support the injured area  Wrapping the injured area provides support for short, necessary activities. Be careful not to wrap the area too tightly. This could cut off the blood supply.     Support a wrist, elbow, or shoulder with a sling.    Wrap an ankle or knee with an elastic bandage.    Tape a finger or toe to the one  next to it.  Use cold and heat  Cold reduces swelling. Both cold and heat reduce pain. Heat should not be used in the initial treatment of the injury. When using cold or heat, always place a thin towel between the pack and your skin.     Apply ice or a cold pack 10 to 15 minutes every hour you re awake for the first 2 days.    After the swelling goes down, use cold or heat to control pain. Don t use heat late in the day, since it can cause swelling when you re not active.  Rest and elevate  Rest and elevation help your injury heal faster.    Raise the injured area above your heart level.    Keep the injured area from moving.    Limit the use of the joint or limb.  Use medicine    Aspirin reduces pain and swelling. (Note: Don t give aspirin to a child 18 or younger unless prescribed by the doctor.)    Non-steroidal anti-inflammatory medicines, such as ibuprofen, may reduce pain and swelling, as well. Ask your healthcare provider for advice.    When to call your healthcare provider  Call your healthcare provider if:    The injured joint won t move, or bones make a grating sound when they move    You can t put weight on the injured area, even after 24 hours    The injured body part is cold, blue, tingling, or numb    The joint or limb appears bent or crooked.    Pain increases or doesn t improve in 4 days    When pressing along the injured area, you notice a spot that is especially painful  [employer] last reviewed this educational content on 5/1/2018 2000-2020 [employer]. [address] [facility], [place] [address]. All rights reserved. This information is not intended as a substitute for professional medical care. Always follow your healthcare professional's instructions.               ARTEMIO Rizo, CNP  [facility] Provider

## 2021-03-02 NOTE — PATIENT INSTRUCTIONS
Ice 20 minutes a day for the next few days.    Wear splint until it is feeling better.    Acetaminophen (Tylenol) may be taken up to 4000mg daily (3000mg if over 65) which would be 2 regular strength tablets (325mg) or two extra strength tablets(500mg) up to 4 times a day (3 times a day if over 65).   Check for acetaminophen in other OTC or prescription medications and be sure you add this in the maximum amount you take every day.    Too much acetaminophen can lead to serious liver damage. DO NOT TAKE if you have a history of liver disease.      Patient Education     Self-Care for Strains and Sprains  Most minor strains and sprains can be treated with self-care. Recovering from a strain or sprain may take 6 to 8 weeks. Your self-care goal is to reduce pain and immobilize the injury to speed healing.   Support the injured area  Wrapping the injured area provides support for short, necessary activities. Be careful not to wrap the area too tightly. This could cut off the blood supply.     Support a wrist, elbow, or shoulder with a sling.    Wrap an ankle or knee with an elastic bandage.    Tape a finger or toe to the one next to it.  Use cold and heat  Cold reduces swelling. Both cold and heat reduce pain. Heat should not be used in the initial treatment of the injury. When using cold or heat, always place a thin towel between the pack and your skin.     Apply ice or a cold pack 10 to 15 minutes every hour you re awake for the first 2 days.    After the swelling goes down, use cold or heat to control pain. Don t use heat late in the day, since it can cause swelling when you re not active.  Rest and elevate  Rest and elevation help your injury heal faster.    Raise the injured area above your heart level.    Keep the injured area from moving.    Limit the use of the joint or limb.  Use medicine    Aspirin reduces pain and swelling. (Note: Don t give aspirin to a child 18 or younger unless prescribed by the  doctor.)    Non-steroidal anti-inflammatory medicines, such as ibuprofen, may reduce pain and swelling, as well. Ask your healthcare provider for advice.    When to call your healthcare provider  Call your healthcare provider if:    The injured joint won t move, or bones make a grating sound when they move    You can t put weight on the injured area, even after 24 hours    The injured body part is cold, blue, tingling, or numb    The joint or limb appears bent or crooked.    Pain increases or doesn t improve in 4 days    When pressing along the injured area, you notice a spot that is especially painful  [employer] last reviewed this educational content on 5/1/2018 2000-2020 [employer]. [address] [place] [address], [place] [address]. All rights reserved. This information is not intended as a substitute for professional medical care. Always follow your healthcare professional's instructions.

## 2021-03-02 NOTE — NURSING NOTE
Clinic Administered Medication Documentation    Oral Medication Documentation    Patient was given Acetaminophen. Prior to medication administration, verified patients identity using patient s name and date of birth. Please see MAR and medication order for additional information.     Was entire amount of medication used? Yes  Expiration Date: 11/21    Alexandre Mckeon CMA

## 2021-05-13 ENCOUNTER — NURSE TRIAGE (OUTPATIENT)
Dept: NURSING | Facility: CLINIC | Age: 46
End: 2021-05-13

## 2021-05-13 ENCOUNTER — VIRTUAL VISIT (OUTPATIENT)
Dept: FAMILY MEDICINE | Facility: CLINIC | Age: 46
End: 2021-05-13
Payer: COMMERCIAL

## 2021-05-13 DIAGNOSIS — Z20.822 EXPOSURE TO 2019 NOVEL CORONAVIRUS: Primary | ICD-10-CM

## 2021-05-13 PROCEDURE — 99213 OFFICE O/P EST LOW 20 MIN: CPT | Mod: GT | Performed by: INTERNAL MEDICINE

## 2021-05-13 NOTE — LETTER
[address] [address]  Phone: [phone]    May 13, 2021        Stuart Daily  [address] [address]          To whom it may concern:    RE: Stuart Daily    Patient was seen and treated today at our clinic.  Patient may return to work ON 5/22/2021 with the following:  No restrictions if her Covid test is negative and she is asymptomatic    Please contact me for questions or concerns.      Sincerely,        Oracio Frank MD

## 2021-05-13 NOTE — TELEPHONE ENCOUNTER
Pt is calling.    Exposed to COVID-19 yesterday.  Asymptomatic at this time.   Needs letter for her employer.  Quarantine and care advice reviewed with her. I advised her that she can be tested in 5-7 days from now. At that time, let the physician know that you need a letter for work.   Care advice reviewed.  She verbalized understanding.      COVID 19 Nurse Triage Plan/Patient Instructions    Please be aware that novel coronavirus (COVID-19) may be circulating in the community. If you develop symptoms such as fever, cough, or SOB or if you have concerns about the presence of another infection including coronavirus (COVID-19), please contact your health care provider or visit [email or web address].     Disposition/Instructions    Virtual Visit with provider recommended. Reference Visit Selection Guide.    Thank you for taking steps to prevent the spread of this virus.  o Limit your contact with others.  o Wear a simple mask to cover your cough.  o Wash your hands well and often.    Resources    [facility]: About COVID-19: [email or web address]    CDC: What to Do If You're Sick: www.cdc.gov/coronavirus/2019-ncov/about/steps-when-sick.html    CDC: Ending Home Isolation: www.cdc.gov/coronavirus/2019-ncov/hcp/disposition-in-home-patients.html     CDC: Caring for Someone: www.cdc.gov/coronavirus/2019-ncov/if-you-are-sick/care-for-someone.html     [facility]: Interim Guidance for Hospital Discharge to Home: www.health.[facility].mn.us/diseases/coronavirus/hcp/hospdischarge.pdf    [facility] clinical trials (COVID-19 research studies): [email or web address]     Below are the COVID-19 hotlines at the [facility] of Health ([facility]). Interpreters are available.   o For health questions: Call 725-535-5488 or 1-533.148.9745 (7 a.m. to 7 p.m.)  o For questions about schools and childcare: Call 753-285-1907 or 1-241.577.3578 (7 a.m. to 7 p.m.)     Reason for Disposition    [1]  COVID-19 EXPOSURE (Close Contact) AND [2] within last 14 days BUT [2] NO symptoms    Additional Information    Negative: COVID-19 has been diagnosed by a healthcare provider (HCP)    Negative: COVID-19 lab test positive    Negative: [1] Symptoms of COVID-19 (e.g., cough, fever, SOB, or others) AND [2] lives in an area with community spread    Negative: [1] Symptoms of COVID-19 (e.g., cough, fever, SOB, or others) AND [2] within 14 days of EXPOSURE (close contact) with diagnosed or suspected COVID-19 patient    Negative: [1] Symptoms of COVID-19 (e.g., cough, fever, SOB, or others) AND [2] within 14 days of travel from high-risk area for COVID-19 community spread (identified by CDC)    Negative: [1] Difficulty breathing (shortness of breath) occurs AND [2] onset > 14 days after COVID-19 EXPOSURE (Close Contact) AND [3] no community spread    Negative: [1] Cough occurs AND [2] onset > 14 days after COVID-19 EXPOSURE AND [3] no community spread    Negative: [1] Common cold symptoms AND [2] onset > 14 days after COVID-19 EXPOSURE AND [3] no community spread    Negative: [1] COVID-19 EXPOSURE (Close Contact) within last 14 days AND [2] needs COVID-19 lab test to return to work AND [3] NO symptoms    Negative: [1] COVID-19 EXPOSURE (Close Contact) within last 14 days AND [2] exposed person is a healthcare worker who was NOT using all recommended personal protective equipment (i.e., a respirator-N95 mask, eye protection, gloves, and gown) AND [3] NO symptoms    Protocols used: CORONAVIRUS (COVID-19) EXPOSURE-A-OH    Tiny Fleming RN  [facility] Nurse Advisor  5/13/2021 at 10:17 AM

## 2021-05-13 NOTE — PROGRESS NOTES
Aunty is a 45 year old who is being evaluated via a billable video visit.      How would you like to obtain your AVS? MyChart  If the video visit is dropped, the invitation should be resent by: Text to cell phone: [phone]  Will anyone else be joining your video visit? No      Video Start Time: 240PM    Assessment & Plan     Exposure to 2019 novel coronavirus  Patient works in a bank  She has a 8-year-old daughter  She also has a 19-year-old daughter  Her 19-year-old daughter had Covid  She is currently symptomatic  She takes care of her 8-year-old daughter  Her 8-year-old daughter was exposed to the 19-year-old daughter and patient was exposed to both of them  Last exposure was yesterday night  She is currently symptomatic  She does not have any other risk factors for comorbidities  Discussed about quarantine  Work note given  We will go ahead and check Covid  Has been advised to also get her doctor check if she becomes symptomatic  Also advised to call me back if the patient starts getting symptomatic  We will- Asymptomatic COVID-19 Virus (Coronavirus) by PCR; Future      20 minutes spent on the date of the encounter doing chart review, history and exam, documentation and further activities per the note           Return if symptoms worsen or fail to improve.    Oracio Frank MD  [facility]    Subjective   Aunty is a 45 year old who presents for the following health issues     HPI   She was exposed to Covid  Her elder daughter who is 19 years had Covid  Her and her daughter who is 8 years old is being looked after by the elder daughter  Last exposure was started night  Patient is currently symptomatic        Review of Systems   Constitutional, HEENT, cardiovascular, pulmonary, gi and gu systems are negative, except as otherwise noted.      Objective           Vitals:  No vitals were obtained today due to virtual visit.    Physical Exam   GENERAL: Healthy, alert and no distress  EYES:  Eyes grossly normal to inspection.  No discharge or erythema, or obvious scleral/conjunctival abnormalities.  RESP: No audible wheeze, cough, or visible cyanosis.  No visible retractions or increased work of breathing.    SKIN: Visible skin clear. No significant rash, abnormal pigmentation or lesions.  NEURO: Cranial nerves grossly intact.  Mentation and speech appropriate for age.  PSYCH: Mentation appears normal, affect normal/bright, judgement and insight intact, normal speech and appearance well-groomed.                Video-Visit Details    Type of service:  Video Visit    Video End Time:255 PM    Originating Location (pt. Location): Home    Distant Location (provider location):  [facility]     Platform used for Video Visit: [employer]

## 2021-05-13 NOTE — PATIENT INSTRUCTIONS
Patient Education     Coronavirus Disease 2019 (COVID-19): Caring for Yourself or Others   If you or a household member have symptoms of COVID-19, follow the guidelines below. This will help you manage symptoms and keep the virus from spreading.  If you have symptoms of COVID-19    Stay home and contact your care team. They will tell you what to do. You may be told to stay home and away from others (self-isolate). You may also be told to stay at least 6 feet from others (social distancing).    Stay away from work, school, and public places.    Limit physical contact with others in your home. Limit visitors. No kissing.  Clean surfaces you touch with disinfectant.  If you need to cough or sneeze, do it into a tissue. Then throw the tissue into the trash. If you don't have tissues, cough or sneeze into the bend of your elbow.  Don t share food or personal items with people in your household. This includes items like eating and drinking utensils, towels, and bedding.  Wear a cloth face mask around other people. During a public health emergency, medical face masks may be reserved for healthcare workers. You may need to make a cloth face mask of your own. You can do this using a bandana, T-shirt, or other cloth. The CDC has instructions on how to make a face mask. Wear the mask so that it covers both your nose and mouth.  If you need to go to a hospital or clinic, call ahead to let them know. Expect the care team to wear masks, gowns, gloves, and eye protection. You may need to come to a different entrance or wait in a separate room.  Follow all instructions from your care team.    If you ve been diagnosed with COVID-19    Stay home and away from others, including other people in your home. (This is called self-isolation.) Don t leave home unless you need to get medical care. Don t go to work, school, or public places. Don t use buses, taxis, or other public transportation.    Follow all instructions from your care  team.    If you need to go to a hospital or clinic, call ahead to let them know. Expect the care team to wear masks, gowns, gloves, and eye protection. You may need to come to a different entrance or wait in a separate room.    Wear a face mask over your nose and mouth. This is to protect others from your germs. If you can t wear a mask, your caregivers should wear one. You may need to make your own mask using a bandana, T-shirt, or other cloth. See the CDC s instructions on how to make a face mask.    Have no contact with pets and other animals.    Don t share food or personal items with people in your household. This includes items like eating and drinking utensils, towels, and bedding.    If you need to cough or sneeze, do it into a tissue. Then throw the tissue into the trash. If you don't have tissues, cough or sneeze into the bend of your elbow.    Wash your hands often.    Self-care at home  The FDA has approved an antiviral medicine (called remdesivir) for people being treated in the hospital. This is for people 12 years and older who weigh more than about 88 pounds (40 kgs). In certain cases, it may also be used for people younger than 12 years or who weigh less than about 88 pounds (40 kgs)..  Currently, treatment is mostly aimed at helping your body while it fights the virus.    Getting extra rest.    Drink extra fluids 6 to 8 glasses of liquids each day), unless a doctor has told you not to. Ask your care team which fluids are best for you. Avoid fluids that contain caffeine or alcohol.    Taking over-the-counter (OTC) medicine to reduce pain and fever. Your care team will tell you which medicine to use.  If you ve been in the hospital for COVID-19, follow your care team s instructions. They will tell you when to stop self-isolation. They may also have you change positions to help your breathing (such as lying on your belly).  If a test showed that you have COVID-19, you may be asked to donate plasma  after you ve recovered. (This is called COVID-19 convalescent plasma donation.) The plasma may contain antibodies to help fight the virus in others. Experts don't know the safety of plasma or how well it works. Research continues, and the FDA has approved it for emergency use in certain people with serious or life-threatening COVID-19.  Caring for a sick person     Follow all instructions from the care team.    Wash your hands often.    Wear protective clothing as advised.    Make sure the sick person wears a mask. If they can't wear a mask, don t stay in the same room with them. If you must be in the same room, wear a face mask. Make sure the mask covers both the nose and mouth.    Keep track of the sick person s symptoms.    Clean surfaces often with disinfectant. This includes phones, kitchen counters, fridge door handles, bathroom surfaces, and others.    Don t let anyone share household items with the sick person. This includes eating and drinking tools, towels, sheets, or blankets.    Clean fabrics and laundry well.    Keep other people and pets away from the sick person.    When you can stop self-isolation  When you are sick with COVID-19, you should stay away from other people. This is called self-isolation. The rules for ending self-isolation depend on your health in general.  If you are normally healthy:  You can stop self-isolation when all 3 of these are true:    You ve had no fever--and no medicine that reduces fever--for at least 24 hours. And     Your symptoms are better, such as cough or trouble breathing. And     At least 10 days have passed since your symptoms first started.  Talk with your care team before you leave home. They may tell you it s okay to leave, or they may give you different advice. You do not need to be re-tested.  If you have a weak immune system, or you ve had severe COVID-19:  Follow your care team s instructions. You may be asked to self-isolate for 10 days to 20 days after  your symptoms first started. Your care team may want to re-test you for COVID-19.  Note: If you re being treated for cancer, have an immune disorder (such as HIV), or have had a transplant (organ or bone marrow), you may have a weak immune system.  If you've already had COVID-19 once:  If you had the virus over 3 months ago, and you ve been exposed again, treat it like you've never had COVID-19. Stay home, limit your contact with others, call your care team, and watch for symptoms.  If it s been less than 3 months since you had the virus, you re symptom-free, and you ve been exposed again: You don t need to self-isolate. You don t need to be re-tested, unless you have new symptoms of COVID-19 that can t be linked to another illness. But if you develop new symptoms, stay home. Contact your care team if you have questions.  When you return to public settings  When you are well enough to go outside your home, follow the CDC s guidance on cloth face masks.    Anyone over age 2 should wear a face mask in public, especially when it's hard to socially distance. This includes public transit, public protests and marches, and crowded stores, bars, and restaurants.    Face masks are most likely to reduce the spread of COVID-19 when they are widely used by people who are out in the public.  Certain people should not wear a face covering. These include:    Children under 2 years old    Anyone with a health, developmental, or mental health condition that can be made worse by wearing a mask    Anyone who is unconscious or unable to remove the face covering without help. See the CDC's guidance on who should not wear a face mask.  When to call your care team  Call your care team right away if a sick person has any of these:    Trouble breathing    Pain or pressure in chest  If a sick person has any of these, call 911:    Trouble breathing that gets worse    Pain or pressure in chest that gets worse    Blue tint to lips or  "face    Fast or irregular heartbeat    Confusion or trouble waking    Fainting or loss of consciousness    Coughing up blood  Going home from the hospital   If you have COVID-19 and were recently in the hospital:    Follow the instructions above for self-care and isolation.    Follow the hospital care team s instructions.    Ask questions if anything is unclear to you. Write down answers so you remember them.  Date last modified: 1/15/2021  Grant last reviewed this educational content on 4/1/2020  This information has been modified by your health care provider with permission from the publisher.    9664-9391 [employer]. [address] [place] [id number]. All rights reserved. This information is not intended as a substitute for professional medical care. Always follow your healthcare professional's instructions.           Patient Education     Coronavirus Disease 2019 (COVID-19): Prevention  The best way to prevent COVID-19 is to avoid contact with the virus.  The FDA has approved a vaccine to prevent COVID-19 in people 16 years and older who are not pregnant or breastfeeding. It's not yet available to the entire public. The first people to get the vaccine are healthcare staff and those living in long-term care facilities. The vaccine is given as a shot (injection) in the arm muscle. Two doses are needed. The second dose is given several weeks after the first.  It s important to keep up on vaccines for other illnesses, including flu and pnuemonia. This is even more true if you re at higher risk for severe illness. Everyone 6 months and older should get a yearly flu vaccine, with rare exceptions.  Cancel travel and other outings  Stay informed about COVID-19 in your area. Follow local instructions. School, sporting events and other activities may be cancelled. You may need to avoid public gatherings.  Stay at least 6 feet away from others as much as possible. This is called \"social " "distancing.\" You may also be asked to stay at home and isolate yourself. You may hear terms like \"self-isolate, \"quarantine,\"  stay at home,   shelter in place,  and  lockdown.   Don t travel to areas with a COVID-19 outbreak. Don t go on a cruise. It s best to cancel any non-essential travel right now. For the most up-to-date travel advisories, visit the CDC website at www.cdc.gov/coronavirus/2019-ncov/travelers.  When you re at home    Wash your hands often. Use soap and clean, running water for at least 20 seconds. Or, use hand  that has at least 60% alcohol.    Don t touch your eyes, nose, or mouth unless you have clean hands.    Don t kiss someone who is sick.    If you need to cough or sneeze, do it into a tissue. Then throw the tissue into the trash. If you don t have tissues, cough or sneeze into the bend of your elbow.    Clean \"high-touch\" surfaces, like doorknobs, handles, light switches, desks, printers, phones, kitchen counters, tables, bathroom surfaces and soiled surfaces. Clean these often with disinfectant (read the label for instructions). For cleaning tips, go to www.cdc.gov/coronavirus/2019-ncov/prepare/cleaning-disinfection.html.    Check your home supplies. Consider keeping a 2-week supply of medicines, food, and other needed household items.    Make a plan for childcare, work, and ways to stay in touch with others. Know who will help you if you get sick.    Don t be around people who are sick.    Don t share eating or drinking utensils with sick people.    Wash your hands after touching animals. Don t touch animals that may be sick.    If you leave home      Stay at least 6 feet away from all people.    Try to avoid \"high-touch\" public surfaces, like doorknobs, handles, and light switches. If you need to touch these, clean them first with a disinfecting wipe. Or, touch them using a tissue or paper towel.    Use hand  often. Make sure it has at least 60% alcohol.    Don t " touch your eyes, nose, or mouth unless you have clean hands.    If you need to cough or sneeze, do it into a tissue. Then, throw the tissue into the trash. If you don t have tissues, cough or sneeze into the bend of your elbow.    Avoid public gatherings. If you do attend public gatherings, stay at least 6 feet away from others. Don t share food, water bottles, or other personal items.    Anyone over age 2 should wear a cloth face mask in public, especially when it s hard to socially distance. This includes public transit, public protests and marches, and crowded stores, bars, and restaurants.  ? The mask should cover both your nose and mouth. You may need to make your own mask using a bandana, T-shirt, or other cloth. See the CDC s instructions on how to make a mask.  ? Face masks are most likely to reduce the spread of COVID-19 when they are widely used by people who are out in the public.    Certain people should not wear a face mask. These include:  ? Children under 2 years old  ? Anyone with a health, developmental, or mental health condition that can be made worse by wearing a mask  ? Anyone who is unconscious or unable to remove the face covering without help. See the CDC s guidance on who should not wear a face mask.  If you are at a work site    Follow all of the instructions above.    If you feel sick in any way, go home and stay home.    Tell your manager if you are well but live with someone who has COVID-19.    Wash your hands often with soap and clean, running water for at least 20 seconds. Or, use hand  with at least 60% alcohol.    Don t shake hands. Don t have in-person meetings. (Meet over phone or video, if possible.)    Don t use other people s desks, offices, phones or equipment (pens, keyboards, eating or drinking utensils, etc.), if possible.    Use office sarah one person at a time. Don t share coffee, tea or food in the office. Don t have meals in groups.    Wear a mask over your  nose and mouth.    Clean work surfaces often with disinfectant. These include desks, photocopiers, printers, phones, kitchen counters, fridge door handles, bathroom surfaces, and others.    If you ve been exposed to someone with COVID-19  If you ve been around someone who has (or may have) COVID-19:    Contact your care team to ask for advice. Follow all instructions. You may need to stay home and away for others (quarantine). The CDC still supports doing this for 14 days after exposure, but this is a hardship for many people who need to work. For this reason, the CDC now gives two options. If you have been exposed but don't have symptoms, you can stop quarantine:  ? 10 days after exposure, if you don't get a diagnostic (viral) test, or  ? 7 days after getting a negative viral test result. (A negative result suggests you don t have the virus).    Take your temperature every morning and evening for 14 days. This is to check for fever. Keep a record of the readings.    Watch for symptoms of the virus. (See the CDC s symptom .) If you have symptoms, stay away from others and contact your care team. If you need to go to a clinic or hospital, call ahead to let them know you re coming.    If you ve had COVID-19 in the last 3 months, but now you re symptom-free, your limits are different: You don t need to quarantine. You don t need to be re-tested for COVID-19, unless you have symptoms of the virus that can t be linked to another illness. (If you do develop symptoms, stay home.)    If you had COVID-19 more than 3 months ago, and you ve been exposed again: Treat it like you ve never had COVID-19. Stay home, limit your contact with others, call your care team, and check for symptoms.  When to contact your care team  Call your care team if you think you have COVID-19 symptoms. These can include fever, cough, trouble breathing, body aches, headaches, chills or repeated shaking with chills, sore throat, loss of taste or  smell, or diarrhea (loose, watery poops). Don t go to a clinic or hospital before speaking to your care team.  Last modified date: 1/15/2021  Grant last reviewed this educational content on 4/1/2020  This information has been modified by your health care provider with permission from the publisher.    [id number] [employer]. [address] [place] [id number]. All rights reserved. This information is not intended as a substitute for professional medical care. Always follow your healthcare professional s instructions.           Patient Education     Coronavirus Disease 2019 (COVID-19): Caring for Yourself or Others   If you or a household member have symptoms of COVID-19, follow the guidelines below. This will help you manage symptoms and keep the virus from spreading.  If you have symptoms of COVID-19    Stay home and contact your care team. They will tell you what to do. You may be told to stay home and away from others (self-isolate). You may also be told to stay at least 6 feet from others (social distancing).    Stay away from work, school, and public places.    Limit physical contact with others in your home. Limit visitors. No kissing.  Clean surfaces you touch with disinfectant.  If you need to cough or sneeze, do it into a tissue. Then throw the tissue into the trash. If you don't have tissues, cough or sneeze into the bend of your elbow.  Don t share food or personal items with people in your household. This includes items like eating and drinking utensils, towels, and bedding.  Wear a cloth face mask around other people. During a public health emergency, medical face masks may be reserved for healthcare workers. You may need to make a cloth face mask of your own. You can do this using a bandana, T-shirt, or other cloth. The CDC has instructions on how to make a face mask. Wear the mask so that it covers both your nose and mouth.  If you need to go to a hospital or clinic, call ahead  to let them know. Expect the care team to wear masks, gowns, gloves, and eye protection. You may need to come to a different entrance or wait in a separate room.  Follow all instructions from your care team.    If you ve been diagnosed with COVID-19    Stay home and away from others, including other people in your home. (This is called self-isolation.) Don t leave home unless you need to get medical care. Don t go to work, school, or public places. Don t use buses, taxis, or other public transportation.    Follow all instructions from your care team.    If you need to go to a hospital or clinic, call ahead to let them know. Expect the care team to wear masks, gowns, gloves, and eye protection. You may need to come to a different entrance or wait in a separate room.    Wear a face mask over your nose and mouth. This is to protect others from your germs. If you can t wear a mask, your caregivers should wear one. You may need to make your own mask using a bandana, T-shirt, or other cloth. See the CDC s instructions on how to make a face mask.    Have no contact with pets and other animals.    Don t share food or personal items with people in your household. This includes items like eating and drinking utensils, towels, and bedding.    If you need to cough or sneeze, do it into a tissue. Then throw the tissue into the trash. If you don't have tissues, cough or sneeze into the bend of your elbow.    Wash your hands often.    Self-care at home  The FDA has approved an antiviral medicine (called remdesivir) for people being treated in the hospital. This is for people 12 years and older who weigh more than about 88 pounds (40 kgs). In certain cases, it may also be used for people younger than 12 years or who weigh less than about 88 pounds (40 kgs)..  Currently, treatment is mostly aimed at helping your body while it fights the virus.    Getting extra rest.    Drink extra fluids 6 to 8 glasses of liquids each day), unless a  doctor has told you not to. Ask your care team which fluids are best for you. Avoid fluids that contain caffeine or alcohol.    Taking over-the-counter (OTC) medicine to reduce pain and fever. Your care team will tell you which medicine to use.  If you ve been in the hospital for COVID-19, follow your care team s instructions. They will tell you when to stop self-isolation. They may also have you change positions to help your breathing (such as lying on your belly).  If a test showed that you have COVID-19, you may be asked to donate plasma after you ve recovered. (This is called COVID-19 convalescent plasma donation.) The plasma may contain antibodies to help fight the virus in others. Experts don't know the safety of plasma or how well it works. Research continues, and the FDA has approved it for emergency use in certain people with serious or life-threatening COVID-19.  Caring for a sick person     Follow all instructions from the care team.    Wash your hands often.    Wear protective clothing as advised.    Make sure the sick person wears a mask. If they can't wear a mask, don t stay in the same room with them. If you must be in the same room, wear a face mask. Make sure the mask covers both the nose and mouth.    Keep track of the sick person s symptoms.    Clean surfaces often with disinfectant. This includes phones, kitchen counters, fridge door handles, bathroom surfaces, and others.    Don t let anyone share household items with the sick person. This includes eating and drinking tools, towels, sheets, or blankets.    Clean fabrics and laundry well.    Keep other people and pets away from the sick person.    When you can stop self-isolation  When you are sick with COVID-19, you should stay away from other people. This is called self-isolation. The rules for ending self-isolation depend on your health in general.  If you are normally healthy:  You can stop self-isolation when all 3 of these are  true:    You ve had no fever--and no medicine that reduces fever--for at least 24 hours. And     Your symptoms are better, such as cough or trouble breathing. And     At least 10 days have passed since your symptoms first started.  Talk with your care team before you leave home. They may tell you it s okay to leave, or they may give you different advice. You do not need to be re-tested.  If you have a weak immune system, or you ve had severe COVID-19:  Follow your care team s instructions. You may be asked to self-isolate for 10 days to 20 days after your symptoms first started. Your care team may want to re-test you for COVID-19.  Note: If you re being treated for cancer, have an immune disorder (such as HIV), or have had a transplant (organ or bone marrow), you may have a weak immune system.  If you've already had COVID-19 once:  If you had the virus over 3 months ago, and you ve been exposed again, treat it like you've never had COVID-19. Stay home, limit your contact with others, call your care team, and watch for symptoms.  If it s been less than 3 months since you had the virus, you re symptom-free, and you ve been exposed again: You don t need to self-isolate. You don t need to be re-tested, unless you have new symptoms of COVID-19 that can t be linked to another illness. But if you develop new symptoms, stay home. Contact your care team if you have questions.  When you return to public settings  When you are well enough to go outside your home, follow the CDC s guidance on cloth face masks.    Anyone over age 2 should wear a face mask in public, especially when it's hard to socially distance. This includes public transit, public protests and marches, and crowded stores, bars, and restaurants.    Face masks are most likely to reduce the spread of COVID-19 when they are widely used by people who are out in the public.  Certain people should not wear a face covering. These include:    Children under 2 years  old    Anyone with a health, developmental, or mental health condition that can be made worse by wearing a mask    Anyone who is unconscious or unable to remove the face covering without help. See the CDC's guidance on who should not wear a face mask.  When to call your care team  Call your care team right away if a sick person has any of these:    Trouble breathing    Pain or pressure in chest  If a sick person has any of these, call 911:    Trouble breathing that gets worse    Pain or pressure in chest that gets worse    Blue tint to lips or face    Fast or irregular heartbeat    Confusion or trouble waking    Fainting or loss of consciousness    Coughing up blood  Going home from the hospital   If you have COVID-19 and were recently in the hospital:    Follow the instructions above for self-care and isolation.    Follow the hospital care team s instructions.    Ask questions if anything is unclear to you. Write down answers so you remember them.  Date last modified: 1/15/2021  [employer] last reviewed this educational content on 4/1/2020  This information has been modified by your health care provider with permission from the publisher.    3764-5467 [employer]. [address] [place] [address]. All rights reserved. This information is not intended as a substitute for professional medical care. Always follow your healthcare professional's instructions.           Patient Education   After Your COVID-19 (Coronavirus) Test  You have been tested for COVID-19 (coronavirus).   If you'll have surgery in the next few days, we'll let you know ahead of time if you have the virus. Please call your surgeon's office with any questions.  For all other patients: Results are usually available in [employer] within 2 to 3 days.   If you do not have a [employer] account, you'll get a letter in the mail in about 7 to 10 days.   [employer] is often the fastest way to get test results. Please sign up if you do not already  "have a [employer] account. See the handout Getting COVID-19 Test Results in [employer] for help.  What if my test result is positive?  If your test is positive and you have not viewed your result in [employer] you'll get a phone call with your result. (A positive test means that you have the virus.)     Follow the tips under \"How do I self-isolate?\" below for 10 days (20 days if you have a weak immune system).    You don't need to be retested for COVID-19 before going back to school or work. As long as you're fever-free and feeling better, you can go back to school, work and other activities after waiting the 10 or 20 days.  What if I have questions after I get my results?  If you have questions about your results, please visit our testing website at [email or web address].   After 7 to 10 days, if you have not gotten your results:     Call [phone] ([phone]) and ask to speak with our COVID-19 results team.    If you're being treated at an infusion center: Call your infusion center directly.  What are the symptoms of COVID-19?  Cough, fever and trouble breathing are the most common signs of COVID-19.  Other symptoms can include new headaches, new muscle or body aches, new and unexplained fatigue (feeling very tired), chills, sore throat, congestion (stuffy or runny nose), diarrhea (loose poop), loss of taste or smell, belly pain, and nausea or vomiting (feeling sick to your stomach or throwing up).  You may already have symptoms of COVID-19, or they may show up later.  What should I do if I have symptoms?  If you're having surgery: Call your surgeon's office.  For all other patients: Stay home and away from others (self-isolate) until ...    You've had no fever--and no medicine that reduces fever--for 1 full day (24 hours), AND    Other symptoms have gotten better. For example, your cough or breathing has improved, AND    At least 10 days have passed since your symptoms first " "started.  How do I self-isolate?    Stay in your own room, even for meals. Use your own bathroom if you can.    Stay away from others in your home. No hugging, kissing or shaking hands. No visitors.    Don't go to work, school or anywhere else.    Clean \"high touch\" surfaces often (doorknobs, counters, handles). Use household cleaning spray or wipes. You'll find a full list of  on the EPA website: www.epa.gov/pesticide-registration/list-n-disinfectants-use-against-sars-cov-2.    Cover your mouth and nose with a mask or other face covering to avoid spreading germs.    Wash your hands and face often. Use soap and water.    Caregivers in these groups are at risk for severe illness due to COVID-19:  ? People 65 years and older  ? People who live in a nursing home or long-term care facility  ? People with chronic disease (lung, heart, cancer, diabetes, kidney, liver, immunologic)  ? People who have a weakened immune system, including those who:    Are in cancer treatment    Take medicine that weakens the immune system, such as corticosteroids    Had a bone marrow or organ transplant    Have an immune deficiency    Have poorly controlled HIV or AIDS    Are obese (body mass index of 40 or higher)    Smoke regularly    Caregivers should wear gloves while washing dishes, handling laundry and cleaning bedrooms and bathrooms.    Use caution when washing and drying laundry: Don't shake dirty laundry and use the warmest water setting that you can.    For more tips on managing your health at home, go to www.cdc.gov/coronavirus/2019-ncov/downloads/10Things.pdf.  How can I take care of myself at home?  1. Get lots of rest. Drink extra fluids (unless a doctor has told you not to).  2. Take Tylenol (acetaminophen) for fever or pain. If you have liver or kidney problems, ask your family doctor if it's OK to take Tylenol.   Adults can take either:  ? 650 mg (two 325 mg pills) every 4 to 6 hours, or   ? 1,000 mg (two 500 mg " pills) every 8 hours as needed.  ? Note: Don't take more than 3,000 mg in one day. Acetaminophen is found in many medicines (both prescribed and over-the-counter medicines). Read all labels to be sure you don't take too much.   For children, check the Tylenol bottle for the right dose. The dose is based on the child's age or weight.  3. If you have other health problems (like cancer, heart failure, an organ transplant or severe kidney disease): Call your specialty clinic if you don't feel better in the next 2 days.  4. Know when to call 911. Emergency warning signs include:  ? Trouble breathing or shortness of breath  ? Chest pain or pressure that doesn't go away  ? Feeling confused like you haven't felt before, or not being able to wake up  ? Bluish-colored lips or face  5. If your doctor prescribed a blood thinner medicine: Follow their instructions.  Where can I get more information?    [facility] - About COVID-19:   [email or web address]    CDC - If You're Sick: cdc.gov/coronavirus/2019-ncov/about/steps-when-sick.html    CDC - Ending Home Isolation: www.cdc.gov/coronavirus/2019-ncov/hcp/disposition-in-home-patients.html    CDC - Caring for Someone: www.cdc.gov/coronavirus/2019-ncov/if-you-are-sick/care-for-someone.html    [facility] - Interim Guidance for Hospital Discharge to Home: www.health.[facility].[place].us/diseases/coronavirus/hcp/hospdischarge.pdf    [facility] clinical trials (COVID-19 research studies): clinicalaffairs.[facility]/n-clinical-trials    Below are the COVID-19 hotlines at the [facility] of Health ([facility]). Interpreters are available.  ? For health questions: Call 286-516-4445 or 1-741.922.5667 (7 a.m. to 7 p.m.)  ? For questions about schools and childcare: Call 392-716-1822 or [phone] (7 a.m. to 7 p.m.)    For informational purposes only. Not to replace the advice of your health care provider. Clinically reviewed by Infection Prevention and the [facility]  [place] COVID-19 Clinical Team. Copyright   2020 [facility]. All rights reserved. Jaco Solarsi [id number] - Rev 11/11/20.

## 2021-05-14 DIAGNOSIS — Z20.822 EXPOSURE TO 2019 NOVEL CORONAVIRUS: ICD-10-CM

## 2021-05-14 LAB
SARS-COV-2 RNA RESP QL NAA+PROBE: NORMAL
SPECIMEN SOURCE: NORMAL

## 2021-05-14 PROCEDURE — U0003 INFECTIOUS AGENT DETECTION BY NUCLEIC ACID (DNA OR RNA); SEVERE ACUTE RESPIRATORY SYNDROME CORONAVIRUS 2 (SARS-COV-2) (CORONAVIRUS DISEASE [COVID-19]), AMPLIFIED PROBE TECHNIQUE, MAKING USE OF HIGH THROUGHPUT TECHNOLOGIES AS DESCRIBED BY CMS-2020-01-R: HCPCS | Performed by: INTERNAL MEDICINE

## 2021-05-14 PROCEDURE — U0005 INFEC AGEN DETEC AMPLI PROBE: HCPCS | Performed by: INTERNAL MEDICINE

## 2021-05-15 LAB
LABORATORY COMMENT REPORT: NORMAL
SARS-COV-2 RNA RESP QL NAA+PROBE: NEGATIVE
SPECIMEN SOURCE: NORMAL

## 2021-06-12 ENCOUNTER — HEALTH MAINTENANCE LETTER (OUTPATIENT)
Age: 46
End: 2021-06-12

## 2021-10-02 ENCOUNTER — HEALTH MAINTENANCE LETTER (OUTPATIENT)
Age: 46
End: 2021-10-02

## 2021-11-05 ENCOUNTER — OFFICE VISIT (OUTPATIENT)
Dept: URGENT CARE | Facility: URGENT CARE | Age: 46
End: 2021-11-05
Payer: COMMERCIAL

## 2021-11-05 VITALS
OXYGEN SATURATION: 100 % | BODY MASS INDEX: 33.51 KG/M2 | DIASTOLIC BLOOD PRESSURE: 90 MMHG | WEIGHT: 207.6 LBS | HEART RATE: 73 BPM | TEMPERATURE: 97.1 F | SYSTOLIC BLOOD PRESSURE: 139 MMHG

## 2021-11-05 DIAGNOSIS — R11.2 NON-INTRACTABLE VOMITING WITH NAUSEA, UNSPECIFIED VOMITING TYPE: Primary | ICD-10-CM

## 2021-11-05 PROCEDURE — U0005 INFEC AGEN DETEC AMPLI PROBE: HCPCS | Performed by: PHYSICIAN ASSISTANT

## 2021-11-05 PROCEDURE — U0003 INFECTIOUS AGENT DETECTION BY NUCLEIC ACID (DNA OR RNA); SEVERE ACUTE RESPIRATORY SYNDROME CORONAVIRUS 2 (SARS-COV-2) (CORONAVIRUS DISEASE [COVID-19]), AMPLIFIED PROBE TECHNIQUE, MAKING USE OF HIGH THROUGHPUT TECHNOLOGIES AS DESCRIBED BY CMS-2020-01-R: HCPCS | Performed by: PHYSICIAN ASSISTANT

## 2021-11-05 PROCEDURE — 99213 OFFICE O/P EST LOW 20 MIN: CPT | Performed by: PHYSICIAN ASSISTANT

## 2021-11-05 RX ORDER — ONDANSETRON 8 MG/1
8 TABLET, ORALLY DISINTEGRATING ORAL EVERY 8 HOURS PRN
Qty: 10 TABLET | Refills: 0 | Status: SHIPPED | OUTPATIENT
Start: 2021-11-05 | End: 2021-11-12

## 2021-11-05 ASSESSMENT — PAIN SCALES - GENERAL: PAINLEVEL: MODERATE PAIN (5)

## 2021-11-05 NOTE — LETTER
[facility] URGENT CARE [address] [address]  Phone: [phone]    November 5, 2021        Stuart Daily  [address] [address]          To whom it may concern:    RE: Stuart Daily    Patient was seen and treated today at our clinic and missed work.    Please contact me for questions or concerns.      Sincerely,        Fabrizio Smith PA-C

## 2021-11-05 NOTE — PROGRESS NOTES
Assessment & Plan     1. Non-intractable vomiting with nausea, unspecified vomiting type    - Symptomatic COVID-19 Virus (Coronavirus) by PCR Nose  - ondansetron (ZOFRAN-ODT) 8 MG ODT tab; Take 1 tablet (8 mg) by mouth every 8 hours as needed for nausea  Dispense: 10 tablet; Refill: 0    Self isolation until COVID resulted. Graduated diet from fluids to solids as tolerate. Follow up in PCP clinic if not improving over the next week.               Fabrizio Smith PA-C  [facility] URGENT CARE [facility]    Subjective     Aunty is a 46 year old female who presents to clinic today for the following health issues:  Chief Complaint   Patient presents with     Abdominal Pain     stomach ache and vomit since yesterday, last time threw up was at 1:00 a.m.     Vomiting     HPI    Here today for nausea and vomiting 4x yesterday. Nothing today. Nausea has improved. Diarrhea yesterday with nothing today. No watery stools.  No cough. Headache but no fever. No chills or sweats. No body aches. No loss of taste or smell. Able to keep water down. Missed work today and works the weekend. Works at bank with customers. Fully COVID vaccinated.            Review of Systems        Objective    BP (!) 139/90 (BP Location: Left arm, Patient Position: Sitting, Cuff Size: Adult Large)   Pulse 73   Temp 97.1  F (36.2  C) (Tympanic)   Wt 94.2 kg (207 lb 9.6 oz)   SpO2 100%   BMI 33.51 kg/m    Physical Exam  Abdominal:      General: Abdomen is flat. Bowel sounds are normal.      Palpations: Abdomen is soft.      Tenderness: There is abdominal tenderness.

## 2021-11-06 LAB — SARS-COV-2 RNA RESP QL NAA+PROBE: NEGATIVE

## 2021-11-07 SDOH — ECONOMIC STABILITY: TRANSPORTATION INSECURITY
IN THE PAST 12 MONTHS, HAS LACK OF TRANSPORTATION KEPT YOU FROM MEETINGS, WORK, OR FROM GETTING THINGS NEEDED FOR DAILY LIVING?: NO

## 2021-11-07 SDOH — ECONOMIC STABILITY: FOOD INSECURITY: WITHIN THE PAST 12 MONTHS, YOU WORRIED THAT YOUR FOOD WOULD RUN OUT BEFORE YOU GOT MONEY TO BUY MORE.: SOMETIMES TRUE

## 2021-11-07 SDOH — ECONOMIC STABILITY: TRANSPORTATION INSECURITY
IN THE PAST 12 MONTHS, HAS THE LACK OF TRANSPORTATION KEPT YOU FROM MEDICAL APPOINTMENTS OR FROM GETTING MEDICATIONS?: NO

## 2021-11-07 SDOH — ECONOMIC STABILITY: INCOME INSECURITY: IN THE LAST 12 MONTHS, WAS THERE A TIME WHEN YOU WERE NOT ABLE TO PAY THE MORTGAGE OR RENT ON TIME?: YES

## 2021-11-07 SDOH — HEALTH STABILITY: PHYSICAL HEALTH: ON AVERAGE, HOW MANY MINUTES DO YOU ENGAGE IN EXERCISE AT THIS LEVEL?: 0 MIN

## 2021-11-07 SDOH — ECONOMIC STABILITY: FOOD INSECURITY: WITHIN THE PAST 12 MONTHS, THE FOOD YOU BOUGHT JUST DIDN'T LAST AND YOU DIDN'T HAVE MONEY TO GET MORE.: SOMETIMES TRUE

## 2021-11-07 SDOH — HEALTH STABILITY: PHYSICAL HEALTH: ON AVERAGE, HOW MANY DAYS PER WEEK DO YOU ENGAGE IN MODERATE TO STRENUOUS EXERCISE (LIKE A BRISK WALK)?: 0 DAYS

## 2021-11-07 SDOH — ECONOMIC STABILITY: INCOME INSECURITY: HOW HARD IS IT FOR YOU TO PAY FOR THE VERY BASICS LIKE FOOD, HOUSING, MEDICAL CARE, AND HEATING?: SOMEWHAT HARD

## 2021-11-07 ASSESSMENT — ENCOUNTER SYMPTOMS
COUGH: 0
PALPITATIONS: 0
NERVOUS/ANXIOUS: 0
HEARTBURN: 0
FREQUENCY: 1
HEMATOCHEZIA: 0
PARESTHESIAS: 0
NAUSEA: 0
FEVER: 0
BREAST MASS: 0
HEADACHES: 0
ABDOMINAL PAIN: 0
DIZZINESS: 0
DIARRHEA: 0
EYE PAIN: 0
WEAKNESS: 0
CHILLS: 0
ARTHRALGIAS: 0
HEMATURIA: 0
CONSTIPATION: 0
SORE THROAT: 0
MYALGIAS: 0
DYSURIA: 0
SHORTNESS OF BREATH: 0
JOINT SWELLING: 0

## 2021-11-07 ASSESSMENT — LIFESTYLE VARIABLES
HOW OFTEN DO YOU HAVE A DRINK CONTAINING ALCOHOL: NEVER
HOW MANY STANDARD DRINKS CONTAINING ALCOHOL DO YOU HAVE ON A TYPICAL DAY: PATIENT DECLINED
HOW OFTEN DO YOU HAVE SIX OR MORE DRINKS ON ONE OCCASION: NEVER

## 2021-11-07 ASSESSMENT — SOCIAL DETERMINANTS OF HEALTH (SDOH)
HOW OFTEN DO YOU ATTEND CHURCH OR RELIGIOUS SERVICES?: 1 TO 4 TIMES PER YEAR
IN A TYPICAL WEEK, HOW MANY TIMES DO YOU TALK ON THE PHONE WITH FAMILY, FRIENDS, OR NEIGHBORS?: MORE THAN THREE TIMES A WEEK
DO YOU BELONG TO ANY CLUBS OR ORGANIZATIONS SUCH AS CHURCH GROUPS UNIONS, FRATERNAL OR ATHLETIC GROUPS, OR SCHOOL GROUPS?: YES
HOW OFTEN DO YOU GET TOGETHER WITH FRIENDS OR RELATIVES?: ONCE A WEEK

## 2021-11-09 NOTE — PATIENT INSTRUCTIONS
Preventive Health Recommendations  Female Ages 40 to 49    Yearly exam:     See your health care provider every year in order to  1. Review health changes.   2. Discuss preventive care.    3. Review your medicines if your doctor prescribed any.      Get a Pap test every three years (unless you have an abnormal result and your provider advises testing more often).      If you get Pap tests with HPV test, you only need to test every 5 years, unless you have an abnormal result. You do not need a Pap test if your uterus was removed (hysterectomy) and you have not had cancer.      You should be tested each year for STDs (sexually transmitted diseases), if you're at risk.     Ask your doctor if you should have a mammogram.      Have a colonoscopy (test for colon cancer) if someone in your family has had colon cancer or polyps before age 50.       Have a cholesterol test every 5 years.       Have a diabetes test (fasting glucose) after age 45. If you are at risk for diabetes, you should have this test every 3 years.    Shots: Get a flu shot each year. Get a tetanus shot every 10 years.     Nutrition:     Eat at least 5 servings of fruits and vegetables each day.    Eat whole-grain bread, whole-wheat pasta and brown rice instead of white grains and rice.    Get adequate Calcium and Vitamin D.      Lifestyle    Exercise at least 150 minutes a week (an average of 30 minutes a day, 5 days a week). This will help you control your weight and prevent disease.    Limit alcohol to one drink per day.    No smoking.     Wear sunscreen to prevent skin cancer.    See your dentist every six months for an exam and cleaning.    At [facility], we strive to deliver an exceptional experience to you, every time we see you. If you receive a survey, please complete it as we do value your feedback.  If you have MyChart, you can expect to receive results automatically within 24 hours of their completion.  Your  provider will send a note interpreting your results as well.   If you do not have MyChart, you should receive your results in about a week by mail.    Your care team:                            Family Medicine Internal Medicine   MD Javon Lombardo MD Shantel Branch-Fleming, MD Srinivasa Vaka, MD Katya Belousova, PAGilbertoC  Carline Beatty, APRN KHOA Nelson, MD Pediatrics   Carl Venegas, PABETH Chan, MD Char Montero APRN CNP   MD Ashley Reyes MD Deborah Mielke, MD Kim Thein, APRN [facility]      Clinic hours: Monday - Thursday 7 am-6 pm; Fridays 7 am-5 pm.   Urgent care: Monday - Friday 10 am- 8 pm; Saturday and Sunday 9 am-5 pm.    Clinic: [phone]       [facility]: Monday - Thursday 8 am - 7 pm; Friday 8 am - 6 pm  [facility]: [phone]     Use www.[email or web address] for 24/7 diagnosis and treatment of dozens of conditions.

## 2021-11-09 NOTE — PROGRESS NOTES
SUBJECTIVE:   CC: Stuart Daily is an 46 year old woman who presents for preventive health visit.       Patient has been advised of split billing requirements and indicates understanding: Yes  Healthy Habits:     Getting at least 3 servings of Calcium per day:  NO    Bi-annual eye exam:  NO    Dental care twice a year:  NO    Sleep apnea or symptoms of sleep apnea:  None    Diet:  Low salt    Frequency of exercise:  None    Taking medications regularly:  Yes    Medication side effects:  None    PHQ-2 Total Score: 0    Additional concerns today:  No              Today's PHQ-2 Score:   PHQ-2 ( 1999 Pfizer) 11/7/2021   Q1: Little interest or pleasure in doing things 0   Q2: Feeling down, depressed or hopeless 0   PHQ-2 Score 0   Q1: Little interest or pleasure in doing things Not at all   Q2: Feeling down, depressed or hopeless Not at all   PHQ-2 Score 0       Abuse: Current or Past (Physical, Sexual or Emotional) - No  Do you feel safe in your environment? Yes    Have you ever done Advance Care Planning? (For example, a Health Directive, POLST, or a discussion with a medical provider or your loved ones about your wishes):     Social History     Tobacco Use     Smoking status: Never Smoker     Smokeless tobacco: Never Used   Substance Use Topics     Alcohol use: No     If you drink alcohol do you typically have >3 drinks per day or >7 drinks per week? No    Alcohol Use 11/7/2021   Prescreen: >3 drinks/day or >7 drinks/week? Not Applicable   Prescreen: >3 drinks/day or >7 drinks/week? -       Reviewed orders with patient.  Reviewed health maintenance and updated orders accordingly - Yes  Lab work is in process    Breast Cancer Screening:  Any new diagnosis of family breast, ovarian, or bowel cancer? No    FHS-7: No flowsheet data found.  click delete button to remove this line now  Mammogram Screening: Recommended annual mammography  Pertinent mammograms are reviewed under the imaging tab.    History of  "abnormal Pap smear: NO - age 30-65 PAP every 5 years with negative HPV co-testing recommended  PAP / HPV Latest Ref Rng & Units 12/22/2017 10/27/2014 3/2/2012   PAP (Historical) - NIL NIL NIL   HPV16 NEG:Negative Negative - -   HPV18 NEG:Negative Negative - -   HRHPV NEG:Negative Negative - -     Reviewed and updated as needed this visit by clinical staff  Tobacco  Allergies  Meds             Reviewed and updated as needed this visit by Provider                 Review of Systems   Constitutional: Negative for chills and fever.   HENT: Negative for congestion, ear pain, hearing loss and sore throat.    Eyes: Negative for pain and visual disturbance.   Respiratory: Negative for cough and shortness of breath.    Cardiovascular: Negative for chest pain, palpitations and peripheral edema.   Gastrointestinal: Negative for abdominal pain, constipation, diarrhea, heartburn, hematochezia and nausea.   Breasts:  Negative for tenderness, breast mass and discharge.   Genitourinary: Positive for frequency. Negative for dysuria, genital sores, hematuria, pelvic pain, urgency, vaginal bleeding and vaginal discharge.   Musculoskeletal: Negative for arthralgias, joint swelling and myalgias.   Skin: Negative for rash.   Neurological: Negative for dizziness, weakness, headaches and paresthesias.   Psychiatric/Behavioral: Negative for mood changes. The patient is not nervous/anxious.           OBJECTIVE:   BP (!) 142/86 (BP Location: Left arm, Patient Position: Sitting, Cuff Size: Adult Regular)   Pulse 86   Temp 97.2  F (36.2  C) (Tympanic)   Ht 1.676 m (5' 6\")   Wt 96 kg (211 lb 9.6 oz)   LMP 10/14/2021 (Exact Date)   SpO2 100%   BMI 34.15 kg/m    Physical Exam  GENERAL: healthy, alert and no distress  EYES: Eyes grossly normal to inspection, PERRL and conjunctivae and sclerae normal  HENT: ear canals and TM's normal, nose and mouth without ulcers or lesions  NECK: no adenopathy, no asymmetry, masses, or scars and thyroid " normal to palpation  RESP: lungs clear to auscultation - no rales, rhonchi or wheezes  CV: regular rate and rhythm, normal S1 S2, no S3 or S4, no murmur, click or rub, no peripheral edema and peripheral pulses strong  ABDOMEN: soft, nontender, no hepatosplenomegaly, no masses and bowel sounds normal  MS: no gross musculoskeletal defects noted, no edema  SKIN: no suspicious lesions or rashes  NEURO: Normal strength and tone, mentation intact and speech normal  PSYCH: mentation appears normal, affect normal/bright        ASSESSMENT/PLAN:   (Z00.00) Routine general medical examination at a health care facility  (primary encounter diagnosis)  Comment: He is in generally good physical health  She has a history of gestational diabetes  We will check glucose today  Because of her BMI we will check lipid panel  Her monthly cycles are getting scanty I am wondering if she is going through menopause  Colonoscopy needed as she is 46 years  She had the Pap smears done in 2017 along with HPV  Next Pap is in 2022  Plan: MA SCREENING DIGITAL BILAT - Future  (s+30),         INFLUENZA VACCINE IM > 6 MONTHS VALENT IIV4         (AFLURIA/FLUZONE), Lipid panel reflex to direct        LDL Fasting, Glucose, CBC with platelets and         differential, Adult Gastro Ref - Procedure Only            (D50.0) Iron deficiency anemia due to chronic blood loss  Comment: History of iron deficiency anemia but currently her periods are very scanty so I do not know if she is still anemic  She has not been taking iron for the last 3 months  CBC will be rechecked  Plan: ferrous gluconate (FERGON) 324 (38 Fe) MG         tablet              Patient has been advised of split billing requirements and indicates understanding: Yes  COUNSELING:  Reviewed preventive health counseling, as reflected in patient instructions       Regular exercise       Healthy diet/nutrition       Vision screening       Hearing screening       Immunizations    Vaccinated for:  "Influenza             Colon cancer screening    Estimated body mass index is 34.15 kg/m  as calculated from the following:    Height as of this encounter: 1.676 m (5' 6\").    Weight as of this encounter: 96 kg (211 lb 9.6 oz).    Weight management plan: Discussed healthy diet and exercise guidelines    She reports that she has never smoked. She has never used smokeless tobacco.      Counseling Resources:  ATP IV Guidelines  Pooled Cohorts Equation Calculator  Breast Cancer Risk Calculator  BRCA-Related Cancer Risk Assessment: FHS-7 Tool  FRAX Risk Assessment  ICSI Preventive Guidelines  Dietary Guidelines for Americans, 2010  USDA's MyPlate  ASA Prophylaxis  Lung CA Screening    Oracio Frank MD  [facility]  "

## 2021-11-12 ENCOUNTER — OFFICE VISIT (OUTPATIENT)
Dept: FAMILY MEDICINE | Facility: CLINIC | Age: 46
End: 2021-11-12
Payer: COMMERCIAL

## 2021-11-12 VITALS
DIASTOLIC BLOOD PRESSURE: 86 MMHG | SYSTOLIC BLOOD PRESSURE: 142 MMHG | TEMPERATURE: 97.2 F | BODY MASS INDEX: 34.01 KG/M2 | WEIGHT: 211.6 LBS | OXYGEN SATURATION: 100 % | HEART RATE: 86 BPM | HEIGHT: 66 IN

## 2021-11-12 DIAGNOSIS — Z00.00 ROUTINE GENERAL MEDICAL EXAMINATION AT A HEALTH CARE FACILITY: Primary | ICD-10-CM

## 2021-11-12 DIAGNOSIS — D50.0 IRON DEFICIENCY ANEMIA DUE TO CHRONIC BLOOD LOSS: ICD-10-CM

## 2021-11-12 LAB
BASOPHILS # BLD AUTO: 0 10E3/UL (ref 0–0.2)
BASOPHILS NFR BLD AUTO: 1 %
CHOLEST SERPL-MCNC: 178 MG/DL
EOSINOPHIL # BLD AUTO: 0.3 10E3/UL (ref 0–0.7)
EOSINOPHIL NFR BLD AUTO: 5 %
ERYTHROCYTE [DISTWIDTH] IN BLOOD BY AUTOMATED COUNT: 13.3 % (ref 10–15)
FASTING STATUS PATIENT QL REPORTED: YES
FASTING STATUS PATIENT QL REPORTED: YES
GLUCOSE BLD-MCNC: 100 MG/DL (ref 70–99)
HCT VFR BLD AUTO: 38.7 % (ref 35–47)
HDLC SERPL-MCNC: 74 MG/DL
HGB BLD-MCNC: 13.1 G/DL (ref 11.7–15.7)
IMM GRANULOCYTES # BLD: 0 10E3/UL
IMM GRANULOCYTES NFR BLD: 0 %
LDLC SERPL CALC-MCNC: 94 MG/DL
LYMPHOCYTES # BLD AUTO: 2.3 10E3/UL (ref 0.8–5.3)
LYMPHOCYTES NFR BLD AUTO: 35 %
MCH RBC QN AUTO: 29.8 PG (ref 26.5–33)
MCHC RBC AUTO-ENTMCNC: 33.9 G/DL (ref 31.5–36.5)
MCV RBC AUTO: 88 FL (ref 78–100)
MONOCYTES # BLD AUTO: 0.6 10E3/UL (ref 0–1.3)
MONOCYTES NFR BLD AUTO: 9 %
NEUTROPHILS # BLD AUTO: 3.4 10E3/UL (ref 1.6–8.3)
NEUTROPHILS NFR BLD AUTO: 52 %
NONHDLC SERPL-MCNC: 104 MG/DL
PLATELET # BLD AUTO: 257 10E3/UL (ref 150–450)
RBC # BLD AUTO: 4.39 10E6/UL (ref 3.8–5.2)
TRIGL SERPL-MCNC: 51 MG/DL
WBC # BLD AUTO: 6.5 10E3/UL (ref 4–11)

## 2021-11-12 PROCEDURE — 85025 COMPLETE CBC W/AUTO DIFF WBC: CPT | Performed by: INTERNAL MEDICINE

## 2021-11-12 PROCEDURE — 82947 ASSAY GLUCOSE BLOOD QUANT: CPT | Performed by: INTERNAL MEDICINE

## 2021-11-12 PROCEDURE — 36415 COLL VENOUS BLD VENIPUNCTURE: CPT | Performed by: INTERNAL MEDICINE

## 2021-11-12 PROCEDURE — 99396 PREV VISIT EST AGE 40-64: CPT | Mod: 25 | Performed by: INTERNAL MEDICINE

## 2021-11-12 PROCEDURE — 80061 LIPID PANEL: CPT | Performed by: INTERNAL MEDICINE

## 2021-11-12 PROCEDURE — 90686 IIV4 VACC NO PRSV 0.5 ML IM: CPT | Performed by: INTERNAL MEDICINE

## 2021-11-12 PROCEDURE — 90471 IMMUNIZATION ADMIN: CPT | Performed by: INTERNAL MEDICINE

## 2021-11-12 RX ORDER — FERROUS GLUCONATE 324(38)MG
TABLET ORAL
Qty: 100 TABLET | Refills: 2 | Status: SHIPPED | OUTPATIENT
Start: 2021-11-12 | End: 2022-01-06

## 2021-11-12 ASSESSMENT — ENCOUNTER SYMPTOMS
MYALGIAS: 0
HEMATURIA: 0
DIARRHEA: 0
HEARTBURN: 0
HEMATOCHEZIA: 0
FEVER: 0
EYE PAIN: 0
SORE THROAT: 0
JOINT SWELLING: 0
HEADACHES: 0
CHILLS: 0
PALPITATIONS: 0
WEAKNESS: 0
PARESTHESIAS: 0
SHORTNESS OF BREATH: 0
DIZZINESS: 0
NERVOUS/ANXIOUS: 0
COUGH: 0
DYSURIA: 0
CONSTIPATION: 0
ARTHRALGIAS: 0
ABDOMINAL PAIN: 0
FREQUENCY: 1
NAUSEA: 0
BREAST MASS: 0

## 2021-11-12 ASSESSMENT — PAIN SCALES - GENERAL: PAINLEVEL: NO PAIN (0)

## 2021-11-12 ASSESSMENT — MIFFLIN-ST. JEOR: SCORE: 1611.56

## 2021-12-28 ENCOUNTER — TELEPHONE (OUTPATIENT)
Dept: GASTROENTEROLOGY | Facility: CLINIC | Age: 46
End: 2021-12-28
Payer: COMMERCIAL

## 2021-12-28 DIAGNOSIS — Z11.59 ENCOUNTER FOR SCREENING FOR OTHER VIRAL DISEASES: ICD-10-CM

## 2021-12-28 NOTE — TELEPHONE ENCOUNTER
Screening Questions  1. Are you active on mychart? Yes    2. What insurance is in the chart? [facility]     2.  Ordering/Referring Provider: Oracio Frank MD    3. BMI 32.7, If greater than 40 review exclusion criteria also will need EXTENDED PREP    4.  Respiratory Screening (If yes to any of the following Hospital setting only):     Do you use daily home oxygen? N  Do you have mod to severe Obstructive Sleep Apnea? N   Do you have Pulmonary Hypertension? N   Do you have UNCONTROLLED asthma? N    5. Have you had a heart or lung transplant (If yes, please review exclusion criteria) ? N    6. Are you currently on dialysis or have chronic kidney disease? N    7. Have you had a stroke or Transient ischemic attack (TIA) within 6 months? N    8. In the past 6 months, have you had any heart related issues including cardiomyopathy or heart attack? N                 If yes, did it require cardiac stenting or other implantable device?N      9. Do you have any implantable devices in your body (pacemaker, defib, LVAD)? N    10. Do you take nitroglycerin? If yes, how often? N    11. Are you currently taking any blood thinners?N    12. Are you a diabetic? N    13. (Females) Are you currently pregnant? N  If yes, how many weeks?      15. Are you taking any prescription pain medications on a routine schedule? N If yes, MAC sedation and patient will need EXTENDED PREP.    16. Do you have any chemical dependencies such as alcohol, street drugs, or methadone? N If yes, MAC sedation.    17. Do you have any history of post-traumatic stress syndrome, severe anxiety or history of psychosis? N    18. Do you transfer independently? Y    19.  Do you have any issues with constipation? N   If yes, pt will need EXTENDED PREP     20. Preferred Pharmacy for Pre Prescription [facility]/ on Chart     Scheduling Details    Which Colonoscopy Prep was Sent?: SAVANA   Type of Procedure Scheduled: Colonoscopy   Surgeon: Sonja   Date of Procedure:  01/13/2022  Location:  OR   Caller (Please ask for phone number if not scheduled by patient): Stuart Daily       Sedation Type: CS   Conscious Sedation- Needs  for 6 hours after the procedure  MAC/General-Needs  for 24 hours after procedure    Pre-op Required at [facility], [place], [place] and OR for MAC sedation: N  (if yes advise patient they will need a pre-op prior to procedure)      Is patient on blood thinners? -N (If yes- inform patient to follow up with PCP or provider for follow up instructions)     Informed patient they will need an adult  Y  Cannot take any type of public or medical transportation alone    [facility] or [facility]: Y    Confirmed Nurse will call to complete assessment Y    Additional comments: N

## 2022-01-09 ENCOUNTER — LAB (OUTPATIENT)
Dept: URGENT CARE | Facility: URGENT CARE | Age: 47
End: 2022-01-09
Payer: COMMERCIAL

## 2022-01-09 DIAGNOSIS — Z11.59 ENCOUNTER FOR SCREENING FOR OTHER VIRAL DISEASES: ICD-10-CM

## 2022-01-09 PROCEDURE — U0003 INFECTIOUS AGENT DETECTION BY NUCLEIC ACID (DNA OR RNA); SEVERE ACUTE RESPIRATORY SYNDROME CORONAVIRUS 2 (SARS-COV-2) (CORONAVIRUS DISEASE [COVID-19]), AMPLIFIED PROBE TECHNIQUE, MAKING USE OF HIGH THROUGHPUT TECHNOLOGIES AS DESCRIBED BY CMS-2020-01-R: HCPCS

## 2022-01-09 PROCEDURE — U0005 INFEC AGEN DETEC AMPLI PROBE: HCPCS

## 2022-01-10 LAB — SARS-COV-2 RNA RESP QL NAA+PROBE: NEGATIVE

## 2022-01-13 ENCOUNTER — HOSPITAL ENCOUNTER (OUTPATIENT)
Facility: AMBULATORY SURGERY CENTER | Age: 47
Discharge: HOME | End: 2022-01-13
Attending: SURGERY | Admitting: SURGERY
Payer: COMMERCIAL

## 2022-01-13 VITALS
SYSTOLIC BLOOD PRESSURE: 121 MMHG | RESPIRATION RATE: 16 BRPM | HEART RATE: 68 BPM | OXYGEN SATURATION: 99 % | TEMPERATURE: 97 F | DIASTOLIC BLOOD PRESSURE: 86 MMHG

## 2022-01-13 LAB — COLONOSCOPY: NORMAL

## 2022-01-13 PROCEDURE — G8907 PT DOC NO EVENTS ON DISCHARG: HCPCS

## 2022-01-13 PROCEDURE — G8918 PT W/O PREOP ORDER IV AB PRO: HCPCS

## 2022-01-13 PROCEDURE — 45380 COLONOSCOPY AND BIOPSY: CPT

## 2022-01-13 PROCEDURE — 45380 COLONOSCOPY AND BIOPSY: CPT | Mod: PT | Performed by: SURGERY

## 2022-01-13 RX ORDER — ONDANSETRON 2 MG/ML
4 INJECTION INTRAMUSCULAR; INTRAVENOUS EVERY 6 HOURS PRN
Status: DISCONTINUED | OUTPATIENT
Start: 2022-01-13 | End: 2022-01-14 | Stop reason: HOSPADM

## 2022-01-13 RX ORDER — NALOXONE HYDROCHLORIDE 0.4 MG/ML
0.4 INJECTION, SOLUTION INTRAMUSCULAR; INTRAVENOUS; SUBCUTANEOUS
Status: DISCONTINUED | OUTPATIENT
Start: 2022-01-13 | End: 2022-01-14 | Stop reason: HOSPADM

## 2022-01-13 RX ORDER — FENTANYL CITRATE 50 UG/ML
INJECTION, SOLUTION INTRAMUSCULAR; INTRAVENOUS PRN
Status: DISCONTINUED | OUTPATIENT
Start: 2022-01-13 | End: 2022-01-13 | Stop reason: HOSPADM

## 2022-01-13 RX ORDER — FLUMAZENIL 0.1 MG/ML
0.2 INJECTION, SOLUTION INTRAVENOUS
Status: ACTIVE | OUTPATIENT
Start: 2022-01-13 | End: 2022-01-13

## 2022-01-13 RX ORDER — ONDANSETRON 4 MG/1
4 TABLET, ORALLY DISINTEGRATING ORAL EVERY 6 HOURS PRN
Status: DISCONTINUED | OUTPATIENT
Start: 2022-01-13 | End: 2022-01-14 | Stop reason: HOSPADM

## 2022-01-13 RX ORDER — NALOXONE HYDROCHLORIDE 0.4 MG/ML
0.2 INJECTION, SOLUTION INTRAMUSCULAR; INTRAVENOUS; SUBCUTANEOUS
Status: DISCONTINUED | OUTPATIENT
Start: 2022-01-13 | End: 2022-01-14 | Stop reason: HOSPADM

## 2022-01-13 RX ORDER — LIDOCAINE 40 MG/G
CREAM TOPICAL
Status: DISCONTINUED | OUTPATIENT
Start: 2022-01-13 | End: 2022-01-14 | Stop reason: HOSPADM

## 2022-01-13 RX ORDER — PROCHLORPERAZINE MALEATE 10 MG
10 TABLET ORAL EVERY 6 HOURS PRN
Status: DISCONTINUED | OUTPATIENT
Start: 2022-01-13 | End: 2022-01-14 | Stop reason: HOSPADM

## 2022-01-13 NOTE — LETTER
January 18, 2022      Stuart Daily  [address] [facility] [address]        Dear Ms.Killian,    We are writing to inform you of your test results.               [facility]           [address] [place] [address]           Tel [phone]  Stuart Daily  [address] [facility] [address]      January 18, 2022    Dear Stuart,  This letter is to inform you of the results of your pathology report on your colonoscopy.  If you have questions please feel free to call my assistant  At [phone] .    Your pathology report was:  Normal, please follow up by having a repeat colonoscopy in 10 years.    If you do have further questions please don t hesitate to call my assistant at  .  We do not have someone answering the phone all the time at my assistants number so if leave a message may take a day or so to get back to you.  So if more urgent then call the below number.    To make an appointment call [phone]: .   Sincerely,     Sorin Bates M.D.  ___                  If you have any questions or concerns, please call the clinic at the number listed above.       Sincerely,      Sorin Bates MD

## 2022-01-13 NOTE — H&P
46 year old year old female here for colonoscopy for screening.    Patient Active Problem List   Diagnosis     Hyperthyroidism     Vitamin D insufficiency     Obesity (BMI 30.0-34.9)     H. pylori infection       Past Medical History:   Diagnosis Date     Hyperthyroidism 2012    mild Graves disease? Endocrinology-no tx       Past Surgical History:   Procedure Laterality Date     COMBINED ESOPHAGOSCOPY, GASTROSCOPY, DUODENOSCOPY (EGD) WITH CO2 INSUFFLATION N/A 7/16/2018    Procedure: COMBINED ESOPHAGOSCOPY, GASTROSCOPY, DUODENOSCOPY (EGD) WITH CO2 INSUFFLATION;  EGD-ABDOMINAL PAIN. EPIGASTRIC HISTORY OF HELICOBACTER PYLORI INFECTION. JORGITO;  Surgeon: Dustin Troncoso DO;  Location: [facility] OR     ESOPHAGOSCOPY, GASTROSCOPY, DUODENOSCOPY (EGD), COMBINED N/A 7/16/2018    Procedure: COMBINED ESOPHAGOSCOPY, GASTROSCOPY, DUODENOSCOPY (EGD), BIOPSY SINGLE OR MULTIPLE;;  Surgeon: Dustin Troncoso DO;  Location: [facility] OR     NO HISTORY OF SURGERY         [facility]    Current Outpatient Rx   Medication Sig Dispense Refill     VITAMIN D, CHOLECALCIFEROL, PO Take by mouth daily          No Known Allergies    Pt reports that she has never smoked. She has never used smokeless tobacco. She reports that she does not drink alcohol and does not use drugs.    Exam:  /84   Temp 97  F (36.1  C)   Resp 16   SpO2 97%     Awake, Alert OX3  Lungs - CTA bilaterally  CV - RRR, no murmurs, distal pulses intact  Abd - soft, non-distended, non-tender, +BS  Extr - No cyanosis or edema    A/P 46 year old year old female in need of colonoscopy for screening. Risks, benefits, alternatives, and complications were discussed including the possibility of perforation and the patient agreed to proceed    Sorin Bates MD

## 2022-01-17 LAB
PATH REPORT.COMMENTS IMP SPEC: NORMAL
PATH REPORT.FINAL DX SPEC: NORMAL
PATH REPORT.GROSS SPEC: NORMAL
PATH REPORT.MICROSCOPIC SPEC OTHER STN: NORMAL
PATH REPORT.RELEVANT HX SPEC: NORMAL
PHOTO IMAGE: NORMAL

## 2022-01-17 PROCEDURE — 88305 TISSUE EXAM BY PATHOLOGIST: CPT | Performed by: PATHOLOGY

## 2022-02-01 ENCOUNTER — ANCILLARY PROCEDURE (OUTPATIENT)
Dept: MAMMOGRAPHY | Facility: CLINIC | Age: 47
End: 2022-02-01
Attending: INTERNAL MEDICINE
Payer: COMMERCIAL

## 2022-02-01 DIAGNOSIS — Z00.00 ROUTINE GENERAL MEDICAL EXAMINATION AT A HEALTH CARE FACILITY: ICD-10-CM

## 2022-02-01 PROCEDURE — 77067 SCR MAMMO BI INCL CAD: CPT | Mod: TC | Performed by: RADIOLOGY

## 2022-02-07 ENCOUNTER — IMMUNIZATION (OUTPATIENT)
Dept: NURSING | Facility: CLINIC | Age: 47
End: 2022-02-07
Payer: COMMERCIAL

## 2022-02-07 PROCEDURE — 0054A COVID-19,PF,PFIZER (12+ YRS): CPT

## 2022-02-07 PROCEDURE — 91305 COVID-19,PF,PFIZER (12+ YRS): CPT

## 2022-07-19 ENCOUNTER — ANCILLARY PROCEDURE (OUTPATIENT)
Dept: GENERAL RADIOLOGY | Facility: CLINIC | Age: 47
End: 2022-07-19
Attending: NURSE PRACTITIONER
Payer: COMMERCIAL

## 2022-07-19 ENCOUNTER — OFFICE VISIT (OUTPATIENT)
Dept: FAMILY MEDICINE | Facility: CLINIC | Age: 47
End: 2022-07-19
Payer: COMMERCIAL

## 2022-07-19 VITALS
TEMPERATURE: 99.1 F | WEIGHT: 217.4 LBS | HEART RATE: 83 BPM | SYSTOLIC BLOOD PRESSURE: 129 MMHG | RESPIRATION RATE: 19 BRPM | BODY MASS INDEX: 34.12 KG/M2 | OXYGEN SATURATION: 99 % | DIASTOLIC BLOOD PRESSURE: 89 MMHG | HEIGHT: 67 IN

## 2022-07-19 DIAGNOSIS — M25.562 ACUTE PAIN OF LEFT KNEE: Primary | ICD-10-CM

## 2022-07-19 DIAGNOSIS — E66.811 OBESITY (BMI 30.0-34.9): ICD-10-CM

## 2022-07-19 DIAGNOSIS — M25.562 ACUTE PAIN OF LEFT KNEE: ICD-10-CM

## 2022-07-19 DIAGNOSIS — Z11.59 NEED FOR HEPATITIS C SCREENING TEST: ICD-10-CM

## 2022-07-19 LAB
CRP SERPL-MCNC: 4.7 MG/L (ref 0–8)
ERYTHROCYTE [DISTWIDTH] IN BLOOD BY AUTOMATED COUNT: 12.9 % (ref 10–15)
ERYTHROCYTE [SEDIMENTATION RATE] IN BLOOD BY WESTERGREN METHOD: 9 MM/HR (ref 0–20)
HCT VFR BLD AUTO: 38.5 % (ref 35–47)
HGB BLD-MCNC: 13.1 G/DL (ref 11.7–15.7)
MCH RBC QN AUTO: 30.2 PG (ref 26.5–33)
MCHC RBC AUTO-ENTMCNC: 34 G/DL (ref 31.5–36.5)
MCV RBC AUTO: 89 FL (ref 78–100)
PLATELET # BLD AUTO: 255 10E3/UL (ref 150–450)
RBC # BLD AUTO: 4.34 10E6/UL (ref 3.8–5.2)
URATE SERPL-MCNC: 4 MG/DL (ref 2.6–6)
WBC # BLD AUTO: 6 10E3/UL (ref 4–11)

## 2022-07-19 PROCEDURE — 36415 COLL VENOUS BLD VENIPUNCTURE: CPT | Performed by: NURSE PRACTITIONER

## 2022-07-19 PROCEDURE — 73560 X-RAY EXAM OF KNEE 1 OR 2: CPT | Mod: TC | Performed by: RADIOLOGY

## 2022-07-19 PROCEDURE — 86803 HEPATITIS C AB TEST: CPT | Performed by: NURSE PRACTITIONER

## 2022-07-19 PROCEDURE — 99213 OFFICE O/P EST LOW 20 MIN: CPT | Performed by: NURSE PRACTITIONER

## 2022-07-19 PROCEDURE — 84550 ASSAY OF BLOOD/URIC ACID: CPT | Performed by: NURSE PRACTITIONER

## 2022-07-19 PROCEDURE — 86140 C-REACTIVE PROTEIN: CPT | Performed by: NURSE PRACTITIONER

## 2022-07-19 PROCEDURE — 85652 RBC SED RATE AUTOMATED: CPT | Performed by: NURSE PRACTITIONER

## 2022-07-19 PROCEDURE — 85027 COMPLETE CBC AUTOMATED: CPT | Performed by: NURSE PRACTITIONER

## 2022-07-19 NOTE — PATIENT INSTRUCTIONS
At [facility], we strive to deliver an exceptional experience to you, every time we see you. If you receive a survey, please complete it as we do value your feedback.  If you have MyChart, you can expect to receive results automatically within 24 hours of their completion.  Your provider will send a note interpreting your results as well.   If you do not have MyChart, you should receive your results in about a week by mail.    Your care team:                            Family Medicine Internal Medicine   MD Javon Lombardo MD Shantel Branch-Fleming, MD Srinivasa Vaka, MD Katya Belousova, ARTEMIO Chen CNP, MD ([facility]   Carl Venegas, MD Elle Morris MD Amelia Massimini APRN CNP Kim Thein, APRN CNP Bethany Templen, MD             Clinic hours: Monday - Thursday 7 am-6 pm; Fridays 7 am-5 pm.   Urgent care: Monday - Friday 10 am- 8 pm; Saturday and Sunday 9 am-5 pm.    Clinic: [phone]       [facility]: Monday - Thursday 8 am - 7 pm; Friday 8 am - 6 pm  [facility]: [phone]

## 2022-07-19 NOTE — PROGRESS NOTES
"  Assessment & Plan     Acute pain of left knee  checking labs, getting X ray, advised RICE, Ok to use tylenol prn pain for now, consider physical therapy if symptoms not resolving in the next couple of weeks.  - XR Knee Left 1/2 Views  - CBC with platelets  - CRP, inflammation  - ESR: Erythrocyte sedimentation rate  - Uric acid  - CBC with platelets  - CRP, inflammation  - ESR: Erythrocyte sedimentation rate  - Uric acid    Obesity (BMI 30.0-34.9)  Benefits of weight loss reviewed in detail, encouraged her to cut back on the carbohydrates in the diet, consume more fruits and vegetables, drink plenty of water, avoid fruit juices, sodas, get 150 min moderate exercise/week.  Recheck weight in 6 months.      Need for hepatitis C screening test    - Hepatitis C Screen Reflex to HCV RNA Quant and Genotype  - Hepatitis C Screen Reflex to HCV RNA Quant and Genotype      Ordering of each unique test  19 minutes spent on the date of the encounter doing chart review, history and exam, documentation and further activities per the note       BMI:   Estimated body mass index is 34.4 kg/m  as calculated from the following:    Height as of this encounter: 1.693 m (5' 6.65\").    Weight as of this encounter: 98.6 kg (217 lb 6.4 oz).   Weight management plan: Discussed healthy diet and exercise guidelines    Work on weight loss  Regular exercise  See Patient Instructions    Return in about 4 weeks (around 8/16/2022), or if symptoms worsen or fail to improve, for Follow up.    ARTEMIO Brown CNP  [facility]    Subjective   Aunty is a 47 year old presenting for the following health issues:  Knee Pain (L)    History of Present Illness       Reason for visit:  Knee pain  Symptom onset:  1-3 days ago  Symptoms include:  Pain during kneeling  Symptom intensity:  Severe  Symptom progression:  Staying the same  Had these symptoms before:  No  What makes it worse:  Bending  What makes it better:  Walking. " "she has tried Ibuprofen and Aleve without improvement in sx. No injury or falls.    She eats 0-1 servings of fruits and vegetables daily.She consumes 1 sweetened beverage(s) daily.She exercises with enough effort to increase her heart rate 9 or less minutes per day.  She exercises with enough effort to increase her heart rate 3 or less days per week.   She is taking medications regularly.      Review of Systems   Constitutional, HEENT, cardiovascular, pulmonary, gi and gu systems are negative, except as otherwise noted.      Objective    /89 (BP Location: Left arm, Patient Position: Sitting, Cuff Size: Adult Large)   Pulse 83   Temp 99.1  F (37.3  C) (Tympanic)   Resp 19   Ht 1.693 m (5' 6.65\")   Wt 98.6 kg (217 lb 6.4 oz)   SpO2 99%   BMI 34.40 kg/m    Body mass index is 34.4 kg/m .  Physical Exam   GENERAL: healthy, alert and no distress  EYES: Eyes grossly normal to inspection, PERRL and conjunctivae and sclerae normal  HENT: ear canals and TM's normal, nose and mouth without ulcers or lesions  NECK: no adenopathy, no asymmetry, masses, or scars and thyroid normal to palpation  RESP: lungs clear to auscultation - no rales, rhonchi or wheezes  CV: regular rate and rhythm, normal S1 S2, no S3 or S4, no murmur, click or rub, no peripheral edema and peripheral pulses strong  ABDOMEN: soft, nontender, no hepatosplenomegaly, no masses and bowel sounds normal  MS: left knee- no warmth/erythema, no obvious swelling but patient states her knee is swollen,  TTP medial joint line, suprapatellar and infrapatellar tendons, FAROM  , + Valgus stress, negative varus stress, negative ant/post  Drawer, gait is antalgic favoring the left side, otherwise, no gross musculoskeletal defects noted, no edema  SKIN: no suspicious lesions or rashes  NEURO: Normal strength and tone, mentation intact and speech normal  BACK: no CVA tenderness, no paralumbar tenderness  PSYCH: mentation appears normal, affect " normal/bright  LYMPH: no cervical adenopathy    Results for orders placed or performed in visit on 07/19/22 (from the past 24 hour(s))   CBC with platelets   Result Value Ref Range    WBC Count 6.0 4.0 - 11.0 10e3/uL    RBC Count 4.34 3.80 - 5.20 10e6/uL    Hemoglobin 13.1 11.7 - 15.7 g/dL    Hematocrit 38.5 35.0 - 47.0 %    MCV 89 78 - 100 fL    MCH 30.2 26.5 - 33.0 pg    MCHC 34.0 31.5 - 36.5 g/dL    RDW 12.9 10.0 - 15.0 %    Platelet Count 255 150 - 450 10e3/uL   ESR: Erythrocyte sedimentation rate   Result Value Ref Range    Erythrocyte Sedimentation Rate 9 0 - 20 mm/hr       Exam Date Exam Time Accession # Performing Department Results    7/19/22 10:48 AM [id number] [facility]          PACS Images     Show images for XR Knee Left 1/2 Views         Study Result    Narrative & Impression   XR KNEE LT 1/2 VW  7/19/2022 10:48 AM      HISTORY: pain, swelling  X 3 days; Acute pain of left knee     COMPARISON: None.                                                                      IMPRESSION:  Mild narrowing and hypertrophic change of the medial  compartment. Mild hypertrophic change in lateral and patellofemoral  compartments. No fractures are identified. No joint effusion.      TRISTON OLIVARES MD         SYSTEM ID:  [id number]                 .  ..

## 2022-07-20 LAB — HCV AB SERPL QL IA: NONREACTIVE

## 2022-08-03 ENCOUNTER — THERAPY VISIT (OUTPATIENT)
Dept: PHYSICAL THERAPY | Facility: CLINIC | Age: 47
End: 2022-08-03
Attending: NURSE PRACTITIONER
Payer: COMMERCIAL

## 2022-08-03 DIAGNOSIS — M25.562 ACUTE PAIN OF LEFT KNEE: ICD-10-CM

## 2022-08-03 PROCEDURE — 97161 PT EVAL LOW COMPLEX 20 MIN: CPT | Mod: GP | Performed by: PHYSICAL THERAPIST

## 2022-08-03 PROCEDURE — 97110 THERAPEUTIC EXERCISES: CPT | Mod: GP | Performed by: PHYSICAL THERAPIST

## 2022-08-03 ASSESSMENT — ACTIVITIES OF DAILY LIVING (ADL)
KNEE_ACTIVITY_OF_DAILY_LIVING_SUM: 54
KNEE_ACTIVITY_OF_DAILY_LIVING_SCORE: 77.14
SWELLING: I DO NOT HAVE THE SYMPTOM
RISE FROM A CHAIR: ACTIVITY IS SOMEWHAT DIFFICULT
PAIN: THE SYMPTOM AFFECTS MY ACTIVITY SLIGHTLY
WEAKNESS: I DO NOT HAVE THE SYMPTOM
HOW_WOULD_YOU_RATE_THE_CURRENT_FUNCTION_OF_YOUR_KNEE_DURING_YOUR_USUAL_DAILY_ACTIVITIES_ON_A_SCALE_FROM_0_TO_100_WITH_100_BEING_YOUR_LEVEL_OF_KNEE_FUNCTION_PRIOR_TO_YOUR_INJURY_AND_0_BEING_THE_INABILITY_TO_PERFORM_ANY_OF_YOUR_USUAL_DAILY_ACTIVITIES?: 50
SIT WITH YOUR KNEE BENT: ACTIVITY IS SOMEWHAT DIFFICULT
GO UP STAIRS: ACTIVITY IS SOMEWHAT DIFFICULT
HOW_WOULD_YOU_RATE_THE_OVERALL_FUNCTION_OF_YOUR_KNEE_DURING_YOUR_USUAL_DAILY_ACTIVITIES?: NEARLY NORMAL
WALK: ACTIVITY IS NOT DIFFICULT
STAND: ACTIVITY IS NOT DIFFICULT
KNEEL ON THE FRONT OF YOUR KNEE: ACTIVITY IS SOMEWHAT DIFFICULT
GO DOWN STAIRS: ACTIVITY IS SOMEWHAT DIFFICULT
RAW_SCORE: 54
SQUAT: ACTIVITY IS SOMEWHAT DIFFICULT
STIFFNESS: I DO NOT HAVE THE SYMPTOM
GIVING WAY, BUCKLING OR SHIFTING OF KNEE: THE SYMPTOM AFFECTS MY ACTIVITY SLIGHTLY
LIMPING: I DO NOT HAVE THE SYMPTOM
AS_A_RESULT_OF_YOUR_KNEE_INJURY,_HOW_WOULD_YOU_RATE_YOUR_CURRENT_LEVEL_OF_DAILY_ACTIVITY?: NEARLY NORMAL

## 2022-08-03 NOTE — LETTER
[facility]  [address] [address]  [phone]    2022    Re: Stuart Daily   :   1975  MRN:  [id number]   REFERRING PHYSICIAN:   ARTEMIO Gimenez CNP    [facility]  Date of Initial Evaluation:  2022  Visits:  Rxs Used: 1  Reason for Referral:  Acute pain of left knee    EVALUATION SUMMARY    Physical Therapy Initial Evaluation  Subjective:  The history is provided by the patient. No  was used.   Patient Health History  Stuart Daily being seen for L knee pain.   Problem began: 2022 (date of MD).   Problem occurred: Unknown   Pain is reported as 3/10 on pain scale.  General health as reported by patient is good.  Pertinent medical history includes: anemia and thyroid problems.   Red flags:  None as reported by patient.  Medical allergies: none.   Surgeries include:  None.    Current medications:  None.    Current occupation is Full-Time- Bank attendant; Part-time: [employer].   Primary job tasks include:  Computer work, prolonged sitting, pushing/pulling and lifting/carrying.                Therapist Generated HPI Evaluation  Problem details: Presents to PT for treatment of L knee pain; unknown cause     Type of problem:  Left knee.  This is a new condition.  Condition occurred with:  Insidious onset.  Where condition occurred: for unknown reasons.  Patient reports pain:  Sub patellar and in the joint.  Pain is described as sharp and stabbing and is intermittent (with movement).  Pain radiates to:  Lower leg. Pain is worse during the day.  Since onset symptoms are gradually improving.  Associated symptoms:  Buckling/giving out and loss of motion/stiffness (initially pt noted swelling). Symptoms are exacerbated by activity, ascending stairs, descending stairs, transfers and bending/squatting  and relieved by ice, rest and  analgesics.  Special tests included:  X-ray.  Past treatment: none.   Restrictions due to condition include:  Working in normal job without restrictions.  Barriers include:  None as reported by patient.  Re: Stuart Daily   :   1975             Objective:  Gait:    Gait Type:  Normal   Weight Bearing Status:  WBAT   Assistive Devices:  None       Knee Evaluation:  ROM:    AROM  Hyperextension: Left:     Right: 3  Extension:  Left: 5    Right:  0  Flexion: Left: 95    Right: 126    Strength:   Extension:  Left: 3/5    Pain:+/-      Right: 4+/5   Pain:  Flexion:  Left: 5/5   Pain:      Right: 5/5   Pain:    Quad Set Left: Good    Pain:   Quad Set Right: Good    Pain:    Ligament Testing:  Ligament testing knee: tenderness over the medial and lateral joint lines.  Varus 0:  Left:  Neg     Varus 30:  Left:  Neg    Valgus 0:  Left:  Neg    Valgus 30:  Left:  Neg      Lachman's:  Left:  Neg      Special Tests:   Left knee positive for the following special tests:  Patellar Tracking-Abduction Lateral    Palpation:    Left knee tenderness present at:  Medial Joint Line; Lateral Joint Line; Patellar Tendon; Patellar Superior and Patellar Inferior    Edema:  Normal    Assessment/Plan:    Patient is a 47 year old female with left side knee complaints.    Patient has the following significant findings with corresponding treatment plan.                Diagnosis 1:  L knee Pain  Pain -  hot/cold therapy, US and manual therapy  Decreased ROM/flexibility - manual therapy and therapeutic exercise  Decreased strength - therapeutic exercise and therapeutic activities  Impaired gait - gait training  Impaired muscle performance - neuro re-education  Decreased function - therapeutic activities    Therapy Evaluation Codes:   1) History comprised of:   Personal factors that impact the plan of care:      Past/current experiences.    Comorbidity factors that impact the plan of care are:      anemia and thyroid.     Medications  impacting care: None.  Re: Stuart Daily   :   1975    2) Examination of Body Systems comprised of:   Body structures and functions that impact the plan of care:      Knee.   Activity limitations that impact the plan of care are:      Bending, Lifting, Squatting/kneeling, Stairs and transfers.  3) Clinical presentation characteristics are:   Stable/Uncomplicated.  4) Decision-Making    Low complexity using standardized patient assessment instrument and/or measureable assessment of functional outcome.  Cumulative Therapy Evaluation is: Low complexity.    Previous and current functional limitations:  (See Goal Flow Sheet for this information)    Short term and Long term goals: (See Goal Flow Sheet for this information)     Communication ability:  Patient appears to be able to clearly communicate and understand verbal and written communication and follow directions correctly.  Treatment Explanation - The following has been discussed with the patient:   RX ordered/plan of care  Anticipated outcomes  Possible risks and side effects  This patient would benefit from PT intervention to resume normal activities.   Rehab potential is good.    Frequency/Duration:  1x; once daily for 3-4 weeks and reduce to every 3 weeks/once daily for 3-4 weeks    Discharge Plan:  Achieve all LTG.  Independent in home treatment program.  Reach maximal therapeutic benefit.    Please refer to the daily flowsheet for treatment today, total treatment time and time spent performing 1:1 timed codes.     Thank you for your referral.    INQUIRIES  Therapist: Kiera Covarrubias [facility] [address] [address]  Phone: [phone]  Fax: [phone]

## 2022-08-03 NOTE — PROGRESS NOTES
Physical Therapy Initial Evaluation  Subjective:  The history is provided by the patient. No  was used.   Patient Health History  Stuart Daily being seen for L knee pain.     Problem began: 7/19/2022 (date of MD).   Problem occurred: Unknown   Pain is reported as 3/10 on pain scale.  General health as reported by patient is good.  Pertinent medical history includes: anemia and thyroid problems.   Red flags:  None as reported by patient.  Medical allergies: none.   Surgeries include:  None.    Current medications:  None.    Current occupation is Full-Time- Bank attendant; Part-time: [employer].   Primary job tasks include:  Computer work, prolonged sitting, pushing/pulling and lifting/carrying.                  Therapist Generated HPI Evaluation  Problem details: Presents to PT for treatment of L knee pain; unknown cause  .         Type of problem:  Left knee.    This is a new condition.  Condition occurred with:  Insidious onset.  Where condition occurred: for unknown reasons.  Patient reports pain:  Sub patellar and in the joint.  Pain is described as sharp and stabbing and is intermittent (with movement).  Pain radiates to:  Lower leg. Pain is worse during the day.  Since onset symptoms are gradually improving.  Associated symptoms:  Buckling/giving out and loss of motion/stiffness (initially pt noted swelling). Symptoms are exacerbated by activity, ascending stairs, descending stairs, transfers and bending/squatting  and relieved by ice, rest and analgesics.  Special tests included:  X-ray.  Past treatment: none.   Restrictions due to condition include:  Working in normal job without restrictions.  Barriers include:  None as reported by patient.                        Objective:    Gait:    Gait Type:  Normal   Weight Bearing Status:  WBAT   Assistive Devices:  None                                                        Knee Evaluation:  ROM:    AROM    Hyperextension: Left:     Right:  3  Extension:  Left: 5    Right:  0  Flexion: Left: 95    Right: 126        Strength:     Extension:  Left: 3/5    Pain:+/-      Right: 4+/5   Pain:  Flexion:  Left: 5/5   Pain:      Right: 5/5   Pain:    Quad Set Left: Good    Pain:   Quad Set Right: Good    Pain:  Ligament Testing:  Ligament testing knee: tenderness over the medial and lateral joint lines.  Varus 0:  Left:  Neg     Varus 30:  Left:  Neg    Valgus 0:  Left:  Neg    Valgus 30:  Left:  Neg          Lachman's:  Left:  Neg    Special Tests:   Left knee positive for the following special tests:  Patellar Tracking-Abduction Lateral    Palpation:    Left knee tenderness present at:  Medial Joint Line; Lateral Joint Line; Patellar Tendon; Patellar Superior and Patellar Inferior    Edema:  Normal            General     ROS    Assessment/Plan:    Patient is a 47 year old female with left side knee complaints.    Patient has the following significant findings with corresponding treatment plan.                Diagnosis 1:  L knee Pain  Pain -  hot/cold therapy, US and manual therapy  Decreased ROM/flexibility - manual therapy and therapeutic exercise  Decreased strength - therapeutic exercise and therapeutic activities  Impaired gait - gait training  Impaired muscle performance - neuro re-education  Decreased function - therapeutic activities    Therapy Evaluation Codes:   1) History comprised of:   Personal factors that impact the plan of care:      Past/current experiences.    Comorbidity factors that impact the plan of care are:      anemia and thyroid.     Medications impacting care: None.  2) Examination of Body Systems comprised of:   Body structures and functions that impact the plan of care:      Knee.   Activity limitations that impact the plan of care are:      Bending, Lifting, Squatting/kneeling, Stairs and transfers.  3) Clinical presentation characteristics are:   Stable/Uncomplicated.  4) Decision-Making    Low complexity using standardized  patient assessment instrument and/or measureable assessment of functional outcome.  Cumulative Therapy Evaluation is: Low complexity.    Previous and current functional limitations:  (See Goal Flow Sheet for this information)    Short term and Long term goals: (See Goal Flow Sheet for this information)     Communication ability:  Patient appears to be able to clearly communicate and understand verbal and written communication and follow directions correctly.  Treatment Explanation - The following has been discussed with the patient:   RX ordered/plan of care  Anticipated outcomes  Possible risks and side effects  This patient would benefit from PT intervention to resume normal activities.   Rehab potential is good.    Frequency/Duration:  1x; once daily for 3-4 weeks and reduce to every 3 weeks/once daily for 3-4 weeks    Discharge Plan:  Achieve all LTG.  Independent in home treatment program.  Reach maximal therapeutic benefit.    Please refer to the daily flowsheet for treatment today, total treatment time and time spent performing 1:1 timed codes.

## 2022-08-11 ENCOUNTER — THERAPY VISIT (OUTPATIENT)
Dept: PHYSICAL THERAPY | Facility: CLINIC | Age: 47
End: 2022-08-11
Payer: COMMERCIAL

## 2022-08-11 DIAGNOSIS — M25.562 ACUTE PAIN OF LEFT KNEE: Primary | ICD-10-CM

## 2022-08-11 PROCEDURE — 97140 MANUAL THERAPY 1/> REGIONS: CPT | Mod: GP | Performed by: PHYSICAL THERAPIST

## 2022-08-11 PROCEDURE — 97110 THERAPEUTIC EXERCISES: CPT | Mod: GP | Performed by: PHYSICAL THERAPIST

## 2022-08-11 PROCEDURE — 97112 NEUROMUSCULAR REEDUCATION: CPT | Mod: GP | Performed by: PHYSICAL THERAPIST

## 2022-08-18 ENCOUNTER — THERAPY VISIT (OUTPATIENT)
Dept: PHYSICAL THERAPY | Facility: CLINIC | Age: 47
End: 2022-08-18
Payer: COMMERCIAL

## 2022-08-18 DIAGNOSIS — M25.562 ACUTE PAIN OF LEFT KNEE: Primary | ICD-10-CM

## 2022-08-18 PROCEDURE — 97140 MANUAL THERAPY 1/> REGIONS: CPT | Mod: GP | Performed by: PHYSICAL THERAPIST

## 2022-08-18 PROCEDURE — 97112 NEUROMUSCULAR REEDUCATION: CPT | Mod: GP | Performed by: PHYSICAL THERAPIST

## 2022-08-18 PROCEDURE — 97110 THERAPEUTIC EXERCISES: CPT | Mod: GP | Performed by: PHYSICAL THERAPIST

## 2022-09-03 ENCOUNTER — HEALTH MAINTENANCE LETTER (OUTPATIENT)
Age: 47
End: 2022-09-03

## 2022-11-21 PROBLEM — M25.562 ACUTE PAIN OF LEFT KNEE: Status: RESOLVED | Noted: 2022-08-03 | Resolved: 2022-11-21

## 2022-11-21 NOTE — PROGRESS NOTES
DISCHARGE SUMMARY    Stuart Daily was seen 3 times for evaluation and treatment.  Patient did not return for further treatment and current status is unknown.  Due to short treatment duration, no objective or functional changes were made.  Please see goal flow sheet from episode noted date below and initial evaluation for further information.  Patient is discharged from therapy and therapy episode is resolved as of 11/21/22.      Linked Episodes   Type: Episode: Status: Noted: Resolved: Last update: Updated by:   PHYSICAL THERAPY [id number] Active 8/3/2022  8/18/2022  7:30 AM Kiera Covarrubias, PT      Comments:

## 2023-01-15 ENCOUNTER — HEALTH MAINTENANCE LETTER (OUTPATIENT)
Age: 48
End: 2023-01-15

## 2023-04-29 ENCOUNTER — HEALTH MAINTENANCE LETTER (OUTPATIENT)
Age: 48
End: 2023-04-29

## 2023-06-02 ENCOUNTER — OFFICE VISIT (OUTPATIENT)
Dept: FAMILY MEDICINE | Facility: CLINIC | Age: 48
End: 2023-06-02
Payer: COMMERCIAL

## 2023-06-02 VITALS
WEIGHT: 227.2 LBS | OXYGEN SATURATION: 97 % | HEIGHT: 67 IN | TEMPERATURE: 97.8 F | HEART RATE: 81 BPM | RESPIRATION RATE: 16 BRPM | SYSTOLIC BLOOD PRESSURE: 138 MMHG | BODY MASS INDEX: 35.66 KG/M2 | DIASTOLIC BLOOD PRESSURE: 88 MMHG

## 2023-06-02 DIAGNOSIS — Z13.220 SCREENING FOR HYPERLIPIDEMIA: ICD-10-CM

## 2023-06-02 DIAGNOSIS — Z13.1 SCREENING FOR DIABETES MELLITUS: ICD-10-CM

## 2023-06-02 DIAGNOSIS — Z12.31 VISIT FOR SCREENING MAMMOGRAM: ICD-10-CM

## 2023-06-02 DIAGNOSIS — Z12.4 CERVICAL CANCER SCREENING: ICD-10-CM

## 2023-06-02 DIAGNOSIS — Z00.00 ROUTINE GENERAL MEDICAL EXAMINATION AT A HEALTH CARE FACILITY: Primary | ICD-10-CM

## 2023-06-02 LAB
CHOLEST SERPL-MCNC: 182 MG/DL
HBA1C MFR BLD: 6 % (ref 0–5.6)
HDLC SERPL-MCNC: 71 MG/DL
LDLC SERPL CALC-MCNC: 94 MG/DL
NONHDLC SERPL-MCNC: 111 MG/DL
TRIGL SERPL-MCNC: 86 MG/DL
TSH SERPL DL<=0.005 MIU/L-ACNC: 0.42 UIU/ML (ref 0.3–4.2)

## 2023-06-02 PROCEDURE — 87624 HPV HI-RISK TYP POOLED RSLT: CPT | Performed by: INTERNAL MEDICINE

## 2023-06-02 PROCEDURE — 83036 HEMOGLOBIN GLYCOSYLATED A1C: CPT | Performed by: INTERNAL MEDICINE

## 2023-06-02 PROCEDURE — 80061 LIPID PANEL: CPT | Performed by: INTERNAL MEDICINE

## 2023-06-02 PROCEDURE — G0145 SCR C/V CYTO,THINLAYER,RESCR: HCPCS | Performed by: INTERNAL MEDICINE

## 2023-06-02 PROCEDURE — 84443 ASSAY THYROID STIM HORMONE: CPT | Performed by: INTERNAL MEDICINE

## 2023-06-02 PROCEDURE — 99396 PREV VISIT EST AGE 40-64: CPT | Performed by: INTERNAL MEDICINE

## 2023-06-02 PROCEDURE — 36415 COLL VENOUS BLD VENIPUNCTURE: CPT | Performed by: INTERNAL MEDICINE

## 2023-06-02 ASSESSMENT — ENCOUNTER SYMPTOMS
SORE THROAT: 0
DIARRHEA: 0
CHILLS: 1
CONSTIPATION: 0
ARTHRALGIAS: 1
EYE PAIN: 0
DYSURIA: 0
PALPITATIONS: 0
FEVER: 0
NAUSEA: 0
JOINT SWELLING: 0
DIZZINESS: 0
WEAKNESS: 0
HEADACHES: 0
ABDOMINAL PAIN: 0
SHORTNESS OF BREATH: 0
NERVOUS/ANXIOUS: 0
COUGH: 0
HEMATOCHEZIA: 0
BREAST MASS: 0
PARESTHESIAS: 0
HEMATURIA: 0
FREQUENCY: 1
MYALGIAS: 0
HEARTBURN: 0

## 2023-06-02 ASSESSMENT — PAIN SCALES - GENERAL: PAINLEVEL: NO PAIN (0)

## 2023-06-02 NOTE — PROGRESS NOTES
SUBJECTIVE:   CC: Aunty is an 48 year old who presents for preventive health visit.        View : No data to display.              Healthy Habits:     Getting at least 3 servings of Calcium per day:  NO    Bi-annual eye exam:  NO    Dental care twice a year:  NO    Sleep apnea or symptoms of sleep apnea:  None    Diet:  Regular (no restrictions)    Frequency of exercise:  2-3 days/week    Duration of exercise:  15-30 minutes    Taking medications regularly:  Yes    Medication side effects:  None    PHQ-2 Total Score: 0    Additional concerns today:  No                  Today's PHQ-2 Score:       6/2/2023     6:59 AM   PHQ-2 ( 1999 [employer])   Q1: Little interest or pleasure in doing things 0   Q2: Feeling down, depressed or hopeless 0   PHQ-2 Score 0   Q1: Little interest or pleasure in doing things Not at all   Q2: Feeling down, depressed or hopeless Not at all   PHQ-2 Score 0       Have you ever done Advance Care Planning? (For example, a Health Directive, POLST, or a discussion with a medical provider or your loved ones about your wishes): No, advance care planning information given to patient to review.  Advanced care planning was discussed at today's visit.    Social History     Tobacco Use     Smoking status: Never     Smokeless tobacco: Never   Vaping Use     Vaping status: Never Used   Substance Use Topics     Alcohol use: No             6/2/2023     6:58 AM   Alcohol Use   Prescreen: >3 drinks/day or >7 drinks/week? Not Applicable     Reviewed orders with patient.  Reviewed health maintenance and updated orders accordingly - Yes  Lab work is in process    Breast Cancer Screening:    FHS-7:       2/1/2022     2:14 PM   Breast CA Risk Assessment (FHS-7)   Did any of your first-degree relatives have breast or ovarian cancer? No   Did any of your relatives have bilateral breast cancer? No   Did any man in your family have breast cancer? No   Did any woman in your family have breast and ovarian cancer? No   Did  any woman in your family have breast cancer before age 50 y? No   Do you have 2 or more relatives with breast and/or ovarian cancer? No   Do you have 2 or more relatives with breast and/or bowel cancer? No     click delete button to remove this line now  Mammogram Screening: Recommended annual mammography  Pertinent mammograms are reviewed under the imaging tab.    History of abnormal Pap smear: NO - age 30-65 PAP every 5 years with negative HPV co-testing recommended      Latest Ref Rng & Units 12/22/2017     7:30 AM 12/22/2017     7:29 AM 10/27/2014    12:00 AM   PAP / HPV   PAP (Historical)   NIL   NIL     HPV 16 DNA NEG^Negative Negative       HPV 18 DNA NEG^Negative Negative       Other HR HPV NEG^Negative Negative         Reviewed and updated as needed this visit by clinical staff   Tobacco  Allergies  Meds              Reviewed and updated as needed this visit by Provider                     Review of Systems   Constitutional: Positive for chills. Negative for fever.   HENT: Negative for congestion, ear pain, hearing loss and sore throat.    Eyes: Positive for visual disturbance. Negative for pain.   Respiratory: Negative for cough and shortness of breath.    Cardiovascular: Negative for chest pain, palpitations and peripheral edema.   Gastrointestinal: Negative for abdominal pain, constipation, diarrhea, heartburn, hematochezia and nausea.   Breasts:  Negative for tenderness, breast mass and discharge.   Genitourinary: Positive for frequency. Negative for dysuria, genital sores, hematuria, pelvic pain, urgency, vaginal bleeding and vaginal discharge.   Musculoskeletal: Positive for arthralgias. Negative for joint swelling and myalgias.   Skin: Negative for rash.   Neurological: Negative for dizziness, weakness, headaches and paresthesias.   Psychiatric/Behavioral: Negative for mood changes. The patient is not nervous/anxious.           OBJECTIVE:   /88 (BP Location: Left arm, Patient Position:  "Sitting, Cuff Size: Adult Large)   Pulse 81   Temp 97.8  F (36.6  C) (Oral)   Resp 16   Ht 1.69 m (5' 6.54\")   Wt 103.1 kg (227 lb 3.2 oz)   LMP  (LMP Unknown)   SpO2 97%   BMI 36.08 kg/m    Physical Exam  GENERAL: healthy, alert and no distress  EYES: Eyes grossly normal to inspection, PERRL and conjunctivae and sclerae normal  HENT: ear canals and TM's normal, nose and mouth without ulcers or lesions  NECK: no adenopathy, no asymmetry, masses, or scars and thyroid normal to palpation  RESP: lungs clear to auscultation - no rales, rhonchi or wheezes  CV: regular rate and rhythm, normal S1 S2, no S3 or S4, no murmur, click or rub, no peripheral edema and peripheral pulses strong  ABDOMEN: soft, nontender, no hepatosplenomegaly, no masses and bowel sounds normal   (female): normal female external genitalia, normal urethral meatus, vaginal mucosa pink, moist, well rugated,   Pap smear done in the presence of chaperone Eliseo  MS: no gross musculoskeletal defects noted, no edema  SKIN: no suspicious lesions or rashes  NEURO: Normal strength and tone, mentation intact and speech normal  PSYCH: mentation appears normal, affect normal/bright    Diagnostic Test Results:  Labs reviewed in Epic    ASSESSMENT/PLAN:   (Z00.00) Routine general medical examination at a health care facility  (primary encounter diagnosis)  Comment: Discussed about hepatitis B vaccination  Discussed about diet and exercise  Plan: TSH with free T4 reflex            (Z12.4) Cervical cancer screening  Comment: She is due for Pap smear  This was done in the presence of chaperone    Plan: Pap Screen with HPV - recommended age 30 - 65         years            (Z12.31) Visit for screening mammogram  Comment:   Plan: MA SCREENING DIGITAL BILAT - Future  (s+30)            (Z13.220) Screening for hyperlipidemia  Comment:   Plan: Lipid panel reflex to direct LDL Fasting           (Z13.1) Screening for diabetes mellitus  Comment:   Plan: Hemoglobin " "A1c              Patient has been advised of split billing requirements and indicates understanding: No      COUNSELING:  Reviewed preventive health counseling, as reflected in patient instructions       Regular exercise       Healthy diet/nutrition       Vision screening       Immunizations    Declined: Hepatitis B due to Other               BMI:   Estimated body mass index is 36.08 kg/m  as calculated from the following:    Height as of this encounter: 1.69 m (5' 6.54\").    Weight as of this encounter: 103.1 kg (227 lb 3.2 oz).   Weight management plan: Discussed healthy diet and exercise guidelines      She reports that she has never smoked. She has never used smokeless tobacco.          Oracio Frank MD  [facility]  "

## 2023-06-02 NOTE — PATIENT INSTRUCTIONS
At [facility], we strive to deliver an exceptional experience to you, every time we see you. If you receive a survey, please complete it as we do value your feedback.  If you have MyChart, you can expect to receive results automatically within 24 hours of their completion.  Your provider will send a note interpreting your results as well.   If you do not have MyChart, you should receive your results in about a week by mail.    Your care team:                            Family Medicine Internal Medicine   MD Javon Lombardo MD Shantel Branch-Fleming, MD Srinivasa Vaka, MD Katya Belousova, PAARTEMIO Mcdonald CNP, MD ([facility]   Carl Venegas, MD Elle Morris MD Amelia Massimini APRN KHOA Nesbitt APRN MD Beatriz Rebolledo MD          Clinic hours: Monday - Thursday 7 am-6 pm; Fridays 7 am-5 pm.   Urgent care: Monday - Friday 10 am- 8 pm; Saturday and Sunday 9 am-5 pm.    Clinic: [phone]       [facility]: Monday - Thursday 8 am - 7 pm; Friday 8 am - 6 pm  [facility]: [phone]

## 2023-06-07 LAB
BKR LAB AP GYN ADEQUACY: NORMAL
BKR LAB AP GYN INTERPRETATION: NORMAL
BKR LAB AP HPV REFLEX: NORMAL
BKR LAB AP LMP: NORMAL
BKR LAB AP PREVIOUS ABNORMAL: NORMAL
PATH REPORT.COMMENTS IMP SPEC: NORMAL
PATH REPORT.COMMENTS IMP SPEC: NORMAL
PATH REPORT.RELEVANT HX SPEC: NORMAL

## 2023-06-08 LAB
HUMAN PAPILLOMA VIRUS 16 DNA: NEGATIVE
HUMAN PAPILLOMA VIRUS 18 DNA: POSITIVE
HUMAN PAPILLOMA VIRUS FINAL DIAGNOSIS: ABNORMAL
HUMAN PAPILLOMA VIRUS OTHER HR: POSITIVE

## 2023-06-09 ENCOUNTER — PATIENT OUTREACH (OUTPATIENT)
Dept: OBGYN | Facility: CLINIC | Age: 48
End: 2023-06-09
Payer: COMMERCIAL

## 2023-06-09 PROBLEM — R87.810 CERVICAL HIGH RISK HPV (HUMAN PAPILLOMAVIRUS) TEST POSITIVE: Status: ACTIVE | Noted: 2023-06-02

## 2023-07-24 ENCOUNTER — OFFICE VISIT (OUTPATIENT)
Dept: OBGYN | Facility: CLINIC | Age: 48
End: 2023-07-24
Payer: COMMERCIAL

## 2023-07-24 VITALS
DIASTOLIC BLOOD PRESSURE: 83 MMHG | OXYGEN SATURATION: 100 % | BODY MASS INDEX: 36.37 KG/M2 | SYSTOLIC BLOOD PRESSURE: 148 MMHG | HEART RATE: 77 BPM | WEIGHT: 229 LBS

## 2023-07-24 DIAGNOSIS — R87.810 CERVICAL HIGH RISK HUMAN PAPILLOMAVIRUS (HPV) DNA TEST POSITIVE: Primary | ICD-10-CM

## 2023-07-24 DIAGNOSIS — Z32.02 NEGATIVE PREGNANCY TEST: ICD-10-CM

## 2023-07-24 LAB — HCG UR QL: NEGATIVE

## 2023-07-24 PROCEDURE — 81025 URINE PREGNANCY TEST: CPT | Performed by: OBSTETRICS & GYNECOLOGY

## 2023-07-24 PROCEDURE — 88305 TISSUE EXAM BY PATHOLOGIST: CPT | Performed by: PATHOLOGY

## 2023-07-24 PROCEDURE — 57454 BX/CURETT OF CERVIX W/SCOPE: CPT | Performed by: OBSTETRICS & GYNECOLOGY

## 2023-07-24 NOTE — PROGRESS NOTES
"This 49 y/o female, , LMP 14 months ago in May 2022, presents as a new patient to the [facility] de[facility] for colposcopy of the cervix due to abnormal DNA marker test results on 2023.  Her test was + for high-risk HPV subtype #18 and \"others\" but her co-test results on 2017 were normal.  She does not use contraception since she feels menopausal.  However, she is in a new relationship which began about 9 months ago so blames her current partner for this infection.  She has never had abnormal co-test results in the past so had many questions.    BP (!) 148/83 (BP Location: Left arm, Patient Position: Chair, Cuff Size: Adult Large)   Pulse 77   Wt 103.9 kg (229 lb)   LMP  (LMP Unknown)   SpO2 100%   Breastfeeding No   BMI 36.37 kg/m    ROS:  10 systems were reviewed and the positives were listed under problems.  Today's UPT was negative and she is not interested in discussing contraceptive methods or menopausal testing.  Informed consent was reviewed and obtained for the colposcopy procedure with biopsies.    This colposcopy examination is of the cervix and no gross abnormalities were found upon inspection.  Next, in the dorsal lithotomy position, a bi-valve speculum was placed and 3% acetic acid was applied to her cervix.  After several minutes, this fluid was removed and the SCJ was fully visible and did not require any type of cervical manipulation.      Acetowhitening was present after the 3% acetic acid was applied and appeared thick and dense.  This was persistent and the white light and green filter were applied.  Multiple levels of magnification were used throughout, beginning at low and then going to medium and high.    An endocervical curettage was collected with use of a metal curette.  Next, an ectocervical biopsy was obtained with use of a Tischler's forceps and these two specimens were submitted to pathology.  There were no complications and the patient tolerated the procedure well.  " The results of the submitted pathology will be communicated to the patient via My Chart and the EBL was 1 mL.  Silver nitrite was used to obtain excellent hemostasis and the patient was provided both verbal and written instructions for aftercare.  My colposcopic impression is normal.  If results are negative, then the plan is for a repeat co-test in one year.    20 minutes were spent today in chart review, the patient visit, review of tests, and documentation in regard to the issues noted above.   This did NOT include the time spent in the procedure (colposcopy with biopsies).

## 2023-07-24 NOTE — PATIENT INSTRUCTIONS
If you have any questions regarding your visit, Please contact your care team.    [facility]: [phone]    To Schedule an Appointment 24/7  Call: [phone] TELEPHONE NUM[facility]   DO. Julia Moreno -Surgery Scheduler  Ira - Surgery Scheduler    PAM Vann, PAM Rocha, PAM   [place]  Wednesday and Friday  8:30 a.m-5:00 p.m  [place]porary  Monday 8:30 a.m-5:00 p.m  Typical Surgery day:  Tuesday [facility]  [address] [place] [address] [phone] Phone  [phone] Fax    Imaging Scheduling-All Locations [phone]    [facility]  [address] [place] [address]     Urgent Care locations:  [facility] Monday-Friday   10 am - 8 pm  Saturday and Sunday   9 am - 5 pm [phone]   **Surgeries** Our Surgery Schedulers will contact you to schedule. If you do not receive a call within 3 business days, please call [phone].    [facility] Labor and Delivery:  [phone]    If you need a medication refill, please contact your pharmacy. Please allow 3 business days for your refill to be completed.  As always, Thank you for trusting us with your healthcare needs!  see additional instructions from your care team below

## 2023-07-26 LAB
PATH REPORT.COMMENTS IMP SPEC: NORMAL
PATH REPORT.FINAL DX SPEC: NORMAL
PATH REPORT.FINAL DX SPEC: NORMAL
PATH REPORT.GROSS SPEC: NORMAL
PATH REPORT.GROSS SPEC: NORMAL
PATH REPORT.MICROSCOPIC SPEC OTHER STN: NORMAL
PATH REPORT.MICROSCOPIC SPEC OTHER STN: NORMAL
PATH REPORT.RELEVANT HX SPEC: NORMAL
PATH REPORT.RELEVANT HX SPEC: NORMAL
PHOTO IMAGE: NORMAL
PHOTO IMAGE: NORMAL

## 2023-07-28 ENCOUNTER — ANCILLARY PROCEDURE (OUTPATIENT)
Dept: MAMMOGRAPHY | Facility: CLINIC | Age: 48
End: 2023-07-28
Payer: COMMERCIAL

## 2023-07-28 DIAGNOSIS — Z12.31 VISIT FOR SCREENING MAMMOGRAM: ICD-10-CM

## 2023-07-28 PROCEDURE — 77063 BREAST TOMOSYNTHESIS BI: CPT | Mod: TC | Performed by: RADIOLOGY

## 2023-07-28 PROCEDURE — 77067 SCR MAMMO BI INCL CAD: CPT | Mod: TC | Performed by: RADIOLOGY

## 2023-08-03 ENCOUNTER — PATIENT OUTREACH (OUTPATIENT)
Dept: OBGYN | Facility: CLINIC | Age: 48
End: 2023-08-03
Payer: COMMERCIAL

## 2024-06-25 ENCOUNTER — OFFICE VISIT (OUTPATIENT)
Dept: OPTOMETRY | Facility: CLINIC | Age: 49
End: 2024-06-25
Payer: COMMERCIAL

## 2024-06-25 DIAGNOSIS — H52.13 MYOPIA, BILATERAL: Primary | ICD-10-CM

## 2024-06-25 DIAGNOSIS — H52.4 PRESBYOPIA: ICD-10-CM

## 2024-06-25 PROCEDURE — 92015 DETERMINE REFRACTIVE STATE: CPT | Performed by: OPTOMETRIST

## 2024-06-25 PROCEDURE — 99214 OFFICE O/P EST MOD 30 MIN: CPT | Performed by: OPTOMETRIST

## 2024-06-25 ASSESSMENT — EXTERNAL EXAM - LEFT EYE: OS_EXAM: NORMAL

## 2024-06-25 ASSESSMENT — VISUAL ACUITY
OS_SC+: -1
OD_PH_SC+: -1
OD_SC+: -1
OS_PH_SC: 20/25
OD_SC: 20/70
METHOD: SNELLEN - LINEAR
OS_PH_SC+: -1
OD_PH_SC: 20/30
OS_SC: 20/25
OD_SC: 20/30
OS_SC: 20/60

## 2024-06-25 ASSESSMENT — TONOMETRY
OD_IOP_MMHG: 16
OS_IOP_MMHG: 16
IOP_METHOD: APPLANATION

## 2024-06-25 ASSESSMENT — REFRACTION_MANIFEST
OS_SPHERE: -1.25
OS_CYLINDER: SPHERE
OS_AXIS: 152
OD_AXIS: 114
OD_SPHERE: -1.25
METHOD_AUTOREFRACTION: 1
OD_CYLINDER: +0.50
OS_SPHERE: -1.25
OS_ADD: +2.50
OD_ADD: +2.50
OD_CYLINDER: SPHERE
OD_SPHERE: -1.00
OS_CYLINDER: +0.25

## 2024-06-25 ASSESSMENT — SLIT LAMP EXAM - LIDS
COMMENTS: NORMAL
COMMENTS: NORMAL

## 2024-06-25 ASSESSMENT — KERATOMETRY
OD_AXISANGLE2_DEGREES: 5
OD_K2POWER_DIOPTERS: 45.25
OS_AXISANGLE_DEGREES: 78
OD_AXISANGLE_DEGREES: 95
OS_AXISANGLE2_DEGREES: 168
OD_K1POWER_DIOPTERS: 44.25
OS_K1POWER_DIOPTERS: 44.00
OS_K2POWER_DIOPTERS: 44.75

## 2024-06-25 ASSESSMENT — CONF VISUAL FIELD
OD_SUPERIOR_NASAL_RESTRICTION: 0
OS_INFERIOR_NASAL_RESTRICTION: 0
OD_INFERIOR_TEMPORAL_RESTRICTION: 0
OD_SUPERIOR_TEMPORAL_RESTRICTION: 0
OS_SUPERIOR_TEMPORAL_RESTRICTION: 0
OD_INFERIOR_NASAL_RESTRICTION: 0
OS_INFERIOR_TEMPORAL_RESTRICTION: 0
OS_NORMAL: 1
OS_SUPERIOR_NASAL_RESTRICTION: 0
OD_NORMAL: 1

## 2024-06-25 ASSESSMENT — EXTERNAL EXAM - RIGHT EYE: OD_EXAM: NORMAL

## 2024-06-25 ASSESSMENT — CUP TO DISC RATIO
OS_RATIO: 0.2
OD_RATIO: 0.2

## 2024-06-25 NOTE — LETTER
6/25/2024      Stuart Daily  [address] [facility] [address]      Dear Colleague,    Thank you for referring your patient, Aunelza Daily, to the [facility]. Please see a copy of my visit note below.    Chief Complaint   Patient presents with     Annual Eye Exam         Last Eye Exam: 2013  Dilated Previously: Yes    What are you currently using to see?  does not use glasses or contacts       Distance Vision Acuity: Noticed gradual change in both eyes    Near Vision Acuity: Satisfied with vision while reading and using computer unaided    Eye Comfort: dry and occasionally tingly in the right eye   Do you use eye drops? : No  Occupation or Hobbies: ashish Daniel - Optometric Assistant          Medical, surgical and family histories reviewed and updated 6/25/2024.       OBJECTIVE: See Ophthalmology exam    ASSESSMENT:    ICD-10-CM    1. Myopia, bilateral  H52.13       2. Presbyopia  H52.4           PLAN:     Patient Instructions   Myopia is a result of long eyes. It is commonly referred to as near-sightedness. Seeing clearly in the distance is the main challenge.    Presbyopia is the diagnosis. Presbyopia is an age-related condition where the eye's crystalline lens doesn't change shape as easily as it once did.    Eyeglass prescription given.      The affects of the dilating drops last for 4- 6 hours.  You will be more sensitive to light and vision will be blurry up close.  Do not drive if you do not feel comfortable.  Mydriatic sunglasses were given if needed.    Recommend annual eye exams.      Ani Drew O.D.  [facility]   [address] [place] [address]    [phone]         Again, thank you for allowing me to participate in the care of your patient.        Sincerely,        Ani Drew, OD

## 2024-06-25 NOTE — PATIENT INSTRUCTIONS
Myopia is a result of long eyes. It is commonly referred to as near-sightedness. Seeing clearly in the distance is the main challenge.    Presbyopia is the diagnosis. Presbyopia is an age-related condition where the eye's crystalline lens doesn't change shape as easily as it once did.    Eyeglass prescription given.      The affects of the dilating drops last for 4- 6 hours.  You will be more sensitive to light and vision will be blurry up close.  Do not drive if you do not feel comfortable.  Mydriatic sunglasses were given if needed.    Recommend annual eye exams.      Ani Drew O.D.  [facility]   [address] [place], [place] [address] [phone]

## 2024-06-25 NOTE — PROGRESS NOTES
Chief Complaint   Patient presents with    Annual Eye Exam         Last Eye Exam: 2013  Dilated Previously: Yes    What are you currently using to see?  does not use glasses or contacts       Distance Vision Acuity: Noticed gradual change in both eyes    Near Vision Acuity: Satisfied with vision while reading and using computer unaided    Eye Comfort: dry and occasionally tingly in the right eye   Do you use eye drops? : No  Occupation or Hobbies: ashish Daniel - Optometric Assistant          Medical, surgical and family histories reviewed and updated 6/25/2024.       OBJECTIVE: See Ophthalmology exam    ASSESSMENT:    ICD-10-CM    1. Myopia, bilateral  H52.13       2. Presbyopia  H52.4           PLAN:     Patient Instructions   Myopia is a result of long eyes. It is commonly referred to as near-sightedness. Seeing clearly in the distance is the main challenge.    Presbyopia is the diagnosis. Presbyopia is an age-related condition where the eye's crystalline lens doesn't change shape as easily as it once did.    Eyeglass prescription given.      The affects of the dilating drops last for 4- 6 hours.  You will be more sensitive to light and vision will be blurry up close.  Do not drive if you do not feel comfortable.  Mydriatic sunglasses were given if needed.    Recommend annual eye exams.      Ani Drew O.D.  [address] [id number] [phone]

## 2024-07-06 ENCOUNTER — HEALTH MAINTENANCE LETTER (OUTPATIENT)
Age: 49
End: 2024-07-06

## 2024-07-09 ENCOUNTER — PATIENT OUTREACH (OUTPATIENT)
Dept: OBGYN | Facility: CLINIC | Age: 49
End: 2024-07-09
Payer: COMMERCIAL

## 2024-08-22 ENCOUNTER — ANCILLARY PROCEDURE (OUTPATIENT)
Dept: GENERAL RADIOLOGY | Facility: CLINIC | Age: 49
End: 2024-08-22
Payer: COMMERCIAL

## 2024-08-22 ENCOUNTER — OFFICE VISIT (OUTPATIENT)
Dept: FAMILY MEDICINE | Facility: CLINIC | Age: 49
End: 2024-08-22
Payer: COMMERCIAL

## 2024-08-22 VITALS
BODY MASS INDEX: 36.03 KG/M2 | SYSTOLIC BLOOD PRESSURE: 133 MMHG | TEMPERATURE: 97.3 F | HEART RATE: 84 BPM | DIASTOLIC BLOOD PRESSURE: 83 MMHG | RESPIRATION RATE: 16 BRPM | HEIGHT: 67 IN | WEIGHT: 229.6 LBS | OXYGEN SATURATION: 99 %

## 2024-08-22 DIAGNOSIS — Z00.00 ROUTINE GENERAL MEDICAL EXAMINATION AT A HEALTH CARE FACILITY: Primary | ICD-10-CM

## 2024-08-22 DIAGNOSIS — E66.01 CLASS 2 SEVERE OBESITY DUE TO EXCESS CALORIES WITH SERIOUS COMORBIDITY AND BODY MASS INDEX (BMI) OF 36.0 TO 36.9 IN ADULT (H): ICD-10-CM

## 2024-08-22 DIAGNOSIS — E66.812 CLASS 2 SEVERE OBESITY DUE TO EXCESS CALORIES WITH SERIOUS COMORBIDITY AND BODY MASS INDEX (BMI) OF 36.0 TO 36.9 IN ADULT (H): ICD-10-CM

## 2024-08-22 DIAGNOSIS — G89.29 CHRONIC PAIN OF RIGHT KNEE: ICD-10-CM

## 2024-08-22 DIAGNOSIS — M25.561 CHRONIC PAIN OF RIGHT KNEE: ICD-10-CM

## 2024-08-22 DIAGNOSIS — E05.90 HYPERTHYROIDISM: ICD-10-CM

## 2024-08-22 DIAGNOSIS — R73.03 PREDIABETES: ICD-10-CM

## 2024-08-22 DIAGNOSIS — Z12.31 VISIT FOR SCREENING MAMMOGRAM: ICD-10-CM

## 2024-08-22 DIAGNOSIS — Z12.4 CERVICAL CANCER SCREENING: ICD-10-CM

## 2024-08-22 LAB
ERYTHROCYTE [DISTWIDTH] IN BLOOD BY AUTOMATED COUNT: 13.1 % (ref 10–15)
HBA1C MFR BLD: 6.1 % (ref 0–5.6)
HCT VFR BLD AUTO: 40.1 % (ref 35–47)
HGB BLD-MCNC: 13.5 G/DL (ref 11.7–15.7)
MCH RBC QN AUTO: 29.7 PG (ref 26.5–33)
MCHC RBC AUTO-ENTMCNC: 33.7 G/DL (ref 31.5–36.5)
MCV RBC AUTO: 88 FL (ref 78–100)
PLATELET # BLD AUTO: 261 10E3/UL (ref 150–450)
RBC # BLD AUTO: 4.54 10E6/UL (ref 3.8–5.2)
TSH SERPL DL<=0.005 MIU/L-ACNC: 0.32 UIU/ML (ref 0.3–4.2)
WBC # BLD AUTO: 6.4 10E3/UL (ref 4–11)

## 2024-08-22 PROCEDURE — 83036 HEMOGLOBIN GLYCOSYLATED A1C: CPT

## 2024-08-22 PROCEDURE — 90746 HEPB VACCINE 3 DOSE ADULT IM: CPT

## 2024-08-22 PROCEDURE — 87624 HPV HI-RISK TYP POOLED RSLT: CPT

## 2024-08-22 PROCEDURE — G0145 SCR C/V CYTO,THINLAYER,RESCR: HCPCS

## 2024-08-22 PROCEDURE — 85027 COMPLETE CBC AUTOMATED: CPT

## 2024-08-22 PROCEDURE — 90472 IMMUNIZATION ADMIN EACH ADD: CPT

## 2024-08-22 PROCEDURE — 90471 IMMUNIZATION ADMIN: CPT

## 2024-08-22 PROCEDURE — 84443 ASSAY THYROID STIM HORMONE: CPT

## 2024-08-22 PROCEDURE — 36415 COLL VENOUS BLD VENIPUNCTURE: CPT

## 2024-08-22 PROCEDURE — 90750 HZV VACC RECOMBINANT IM: CPT

## 2024-08-22 PROCEDURE — 99396 PREV VISIT EST AGE 40-64: CPT | Mod: 25

## 2024-08-22 PROCEDURE — 99213 OFFICE O/P EST LOW 20 MIN: CPT | Mod: 25

## 2024-08-22 PROCEDURE — 73562 X-RAY EXAM OF KNEE 3: CPT | Mod: RT | Performed by: STUDENT IN AN ORGANIZED HEALTH CARE EDUCATION/TRAINING PROGRAM

## 2024-08-22 SDOH — HEALTH STABILITY: PHYSICAL HEALTH: ON AVERAGE, HOW MANY DAYS PER WEEK DO YOU ENGAGE IN MODERATE TO STRENUOUS EXERCISE (LIKE A BRISK WALK)?: 3 DAYS

## 2024-08-22 SDOH — HEALTH STABILITY: PHYSICAL HEALTH: ON AVERAGE, HOW MANY MINUTES DO YOU ENGAGE IN EXERCISE AT THIS LEVEL?: 40 MIN

## 2024-08-22 ASSESSMENT — PAIN SCALES - GENERAL: PAINLEVEL: SEVERE PAIN (7)

## 2024-08-22 ASSESSMENT — SOCIAL DETERMINANTS OF HEALTH (SDOH): HOW OFTEN DO YOU GET TOGETHER WITH FRIENDS OR RELATIVES?: ONCE A WEEK

## 2024-08-22 NOTE — PATIENT INSTRUCTIONS
Patient Education   Preventive Care Advice   This is general advice given by our system to help you stay healthy. However, your care team may have specific advice just for you. Please talk to your care team about your preventive care needs.  Nutrition  Eat 5 or more servings of fruits and vegetables each day.  Try wheat bread, brown rice and whole grain pasta (instead of white bread, rice, and pasta).  Get enough calcium and vitamin D. Check the label on foods and aim for 100% of the RDA (recommended daily allowance).  Lifestyle  Exercise at least 150 minutes each week  (30 minutes a day, 5 days a week).  Do muscle strengthening activities 2 days a week. These help control your weight and prevent disease.  No smoking.  Wear sunscreen to prevent skin cancer.  Have a dental exam and cleaning every 6 months.  Yearly exams  See your health care team every year to talk about:  Any changes in your health.  Any medicines your care team has prescribed.  Preventive care, family planning, and ways to prevent chronic diseases.  Shots (vaccines)   HPV shots (up to age 26), if you've never had them before.  Hepatitis B shots (up to age 59), if you've never had them before.  COVID-19 shot: Get this shot when it's due.  Flu shot: Get a flu shot every year.  Tetanus shot: Get a tetanus shot every 10 years.  Pneumococcal, hepatitis A, and RSV shots: Ask your care team if you need these based on your risk.  Shingles shot (for age 50 and up)  General health tests  Diabetes screening:  Starting at age 35, Get screened for diabetes at least every 3 years.  If you are younger than age 35, ask your care team if you should be screened for diabetes.  Cholesterol test: At age 39, start having a cholesterol test every 5 years, or more often if advised.  Bone density scan (DEXA): At age 50, ask your care team if you should have this scan for osteoporosis (brittle bones).  Hepatitis C: Get tested at least once in your life.  STIs (sexually  transmitted infections)  Before age 24: Ask your care team if you should be screened for STIs.  After age 24: Get screened for STIs if you're at risk. You are at risk for STIs (including HIV) if:  You are sexually active with more than one person.  You don't use condoms every time.  You or a partner was diagnosed with a sexually transmitted infection.  If you are at risk for HIV, ask about PrEP medicine to prevent HIV.  Get tested for HIV at least once in your life, whether you are at risk for HIV or not.  Cancer screening tests  Cervical cancer screening: If you have a cervix, begin getting regular cervical cancer screening tests starting at age 21.  Breast cancer scan (mammogram): If you've ever had breasts, begin having regular mammograms starting at age 40. This is a scan to check for breast cancer.  Colon cancer screening: It is important to start screening for colon cancer at age 45.  Have a colonoscopy test every 10 years (or more often if you're at risk) Or, ask your provider about stool tests like a FIT test every year or Cologuard test every 3 years.  To learn more about your testing options, visit:   .  For help making a decision, visit:   https://bit.ly/ma12749.  Prostate cancer screening test: If you have a prostate, ask your care team if a prostate cancer screening test (PSA) at age 55 is right for you.  Lung cancer screening: If you are a current or former smoker ages 50 to 80, ask your care team if ongoing lung cancer screenings are right for you.  For informational purposes only. Not to replace the advice of your health care provider. Copyright   2023 [facility]. All rights reserved. Clinically reviewed by the [facility] Transitions Program. [employer] [id number] - REV 01/24.  Preventing Falls: Care Instructions  Injuries and health problems such as trouble walking or poor eyesight can increase your risk of falling. So can some medicines. But there are things you can do to help  "prevent falls. You can exercise to get stronger. You can also arrange your home to make it safer.    Talk to your doctor about the medicines you take. Ask if any of them increase the risk of falls and whether they can be changed or stopped.   Try to exercise regularly. It can help improve your strength and balance. This can help lower your risk of falling.     Practice fall safety and prevention.    Wear low-heeled shoes that fit well and give your feet good support. Talk to your doctor if you have foot problems that make this hard.  Carry a cellphone or wear a medical alert device that you can use to call for help.  Use stepladders instead of chairs to reach high objects. Don't climb if you're at risk for falls. Ask for help, if needed.  Wear the correct eyeglasses, if you need them.    Make your home safer.    Remove rugs, cords, clutter, and furniture from walkways.  Keep your house well lit. Use night-lights in hallways and bathrooms.  Install and use sturdy handrails on stairways.  Wear nonskid footwear, even inside. Don't walk barefoot or in socks without shoes.    Be safe outside.    Use handrails, curb cuts, and ramps whenever possible.  Keep your hands free by using a shoulder bag or backpack.  Try to walk in well-lit areas. Watch out for uneven ground, changes in pavement, and debris.  Be careful in the winter. Walk on the grass or gravel when sidewalks are slippery. Use de-icer on steps and walkways. Add non-slip devices to shoes.    Put grab bars and nonskid mats in your shower or tub and near the toilet. Try to use a shower chair or bath bench when bathing.   Get into a tub or shower by putting in your weaker leg first. Get out with your strong side first. Have a phone or medical alert device in the bathroom with you.   Where can you learn more?  Go to https://www.[employer].net/patiented  Enter G117 in the search box to learn more about \"Preventing Falls: Care Instructions.\"  Current as of: July 17, " 2023               Content Version: 14.0    9623-6690 [employer].   Care instructions adapted under license by your healthcare professional. If you have questions about a medical condition or this instruction, always ask your healthcare professional. [employer] disclaims any warranty or liability for your use of this information.

## 2024-08-22 NOTE — PROGRESS NOTES
"Preventive Care Visit  [facility]  ARTEMIO Martin CNP, Family Medicine  Aug 22, 2024      Assessment & Plan     Routine general medical examination at a health care facility  - Health maintenance and lifestyle reviewed. Updated medical and family history.  - Follow up in one year or sooner as needed.   - HEPATITIS B, ADULT 20+ (ENGERIX-B/RECOMBIVAX HB)  - REVIEW OF HEALTH MAINTENANCE PROTOCOL ORDERS  - ZOSTER RECOMBINANT ADJUVANTED (SHINGRIX)  - PRIMARY CARE FOLLOW-UP SCHEDULING  - CBC with platelets    Chronic pain of right knee  - No injury with insidious onset, joint pain and stiffness. Patient declines PT at this time. Recommended stretching, moving frequently, diclofenac, tylenol, ibuprofen PRN for pain. No history of imaging, will check XR.    - XR Knee Right 3 Views  - diclofenac (VOLTAREN) 1 % topical gel  Dispense: 350 g; Refill: 3    Hyperthyroidism  - Has been euthyroid. TSH for surveillance.   - TSH with free T4 reflex    Prediabetes  - Due for A1C. Discussed healthy diet and exercise guidelines.  - Hemoglobin A1c    Class 2 severe obesity due to excess calories with serious comorbidity and body mass index (BMI) of 36.0 to 36.9 in adult (H)  - Discussed healthy diet and exercise guidelines.     Cervical cancer screening  - Discussed screening guidelines.   - Pap Screen with HPV - Recommended Age 30 - 65 Years    Visit for screening mammogram  - Discussed screening guidelines.   - MA Screen Bilateral w/Miguel    Patient has been advised of split billing requirements and indicates understanding: Yes    BMI  Estimated body mass index is 36.46 kg/m  as calculated from the following:    Height as of this encounter: 1.69 m (5' 6.54\").    Weight as of this encounter: 104.1 kg (229 lb 9.6 oz).   Weight management plan: Discussed healthy diet and exercise guidelines    Counseling  Appropriate preventive services were addressed with this patient via screening, questionnaire, or " discussion as appropriate for fall prevention, nutrition, physical activity, Tobacco-use cessation, social engagement, weight loss and cognition.  Checklist reviewing preventive services available has been given to the patient.  Reviewed patient's diet, addressing concerns and/or questions.   She is at risk for lack of exercise and has been provided with information to increase physical activity for the benefit of her well-being.   The patient was instructed to see the dentist every 6 months.   She is at risk for psychosocial distress and has been provided with information to reduce risk.     Due for annual physical in one year, otherwise follow up on as-needed basis.    Subjective   Aunty is a 49 year old, presenting for the following:  Physical        8/22/2024     7:10 AM   Additional Questions   Roomed by [facility] Care Directive  Patient does not have a Health Care Directive or Living Will: Discussed advance care planning with patient; information given to patient to review.    HPI    Insidious onset of right knee pain, gradually worsening and now occurs daily. No injury. No swelling. Pain with sitting for a long time, when kneeling to pray. Pain is in medial and lateral joint compartments. None over patella. No effusion.  Knee has given out. Walking helps. Previously did PT on left knee without much benefit, isn't interested in doing PT again at this time. Biofreeze is helpful. Hasn't used tylenol or ibuprofen.    Amenorrhea since December 2023. Normal cycles in November and December 2023, before that amenorrheic for one year. No hot flashes or night sweats.         8/22/2024   General Health   How would you rate your overall physical health? Good   Feel stress (tense, anxious, or unable to sleep) Only a little      (!) STRESS CONCERN      8/22/2024   Nutrition   Three or more servings of calcium each day? Yes   Diet: Regular (no restrictions)   How many servings of fruit and vegetables per day? (!)  0-1   How many sweetened beverages each day? 0-1            8/22/2024   Exercise   Days per week of moderate/strenous exercise 3 days   Average minutes spent exercising at this level 40 min            8/22/2024   Social Factors   Frequency of gathering with friends or relatives Once a week   Worry food won't last until get money to buy more No   Food not last or not have enough money for food? No   Do you have housing? (Housing is defined as stable permanent housing and does not include staying ouside in a car, in a tent, in an abandoned building, in an overnight shelter, or couch-surfing.) Yes   Are you worried about losing your housing? No   Lack of transportation? No   Unable to get utilities (heat,electricity)? No          8/22/2024   Dental   Dentist two times every year? (!) NO          8/22/2024   TB Screening   Were you born outside of the [place]? Yes      Today's PHQ-2 Score:       8/22/2024     7:06 AM   PHQ-2 ( 1999 Pf[employer])   Q1: Little interest or pleasure in doing things 0   Q2: Feeling down, depressed or hopeless 0   PHQ-2 Score 0   Q1: Little interest or pleasure in doing things Not at all   Q2: Feeling down, depressed or hopeless Not at all   PHQ-2 Score 0           8/22/2024   Substance Use   Alcohol more than 3/day or more than 7/wk Not Applicable   Do you use any other substances recreationally? No      Social History     Tobacco Use    Smoking status: Never    Smokeless tobacco: Never   Vaping Use    Vaping status: Never Used   Substance Use Topics    Alcohol use: No    Drug use: No         7/28/2023   LAST FHS-7 RESULTS   1st degree relative breast or ovarian cancer No   Any relative bilateral breast cancer No   Any male have breast cancer No   Any ONE woman have BOTH breast AND ovarian cancer No   Any woman with breast cancer before 50yrs No   2 or more relatives with breast AND/OR ovarian cancer No   2 or more relatives with breast AND/OR bowel cancer No      Mammogram Screening - Mammogram every  "1-2 years updated in Health Maintenance based on mutual decision making        8/22/2024   STI Screening   New sexual partner(s) since last STI/HIV test? No      History of abnormal Pap smear: YES - reflected in Problem List and Health Maintenance accordingly        Latest Ref Rng & Units 6/2/2023     7:54 AM 12/22/2017     7:30 AM 12/22/2017     7:29 AM   PAP / HPV   PAP  Negative for Intraepithelial Lesion or Malignancy (NILM)      PAP (Historical)    NIL    HPV 16 DNA Negative Negative  Negative     HPV 18 DNA Negative Positive  Negative     Other HR HPV Negative Positive  Negative       ASCVD Risk   The 10-year ASCVD risk score (Susan CRAWLEY, et al., 2019) is: 0.9%    Values used to calculate the score:      Age: 49 years      Sex: Female      Is Non- : No      Diabetic: No      Tobacco smoker: No      Systolic Blood Pressure: 133 mmHg      Is BP treated: No      HDL Cholesterol: 71 mg/dL      Total Cholesterol: 182 mg/dL        8/22/2024   Contraception/Family Planning   Questions about contraception or family planning No     Reviewed and updated as needed this visit by Provider   Tobacco   Meds  Problems  Med Hx  Surg Hx  Fam Hx  Soc Hx Sexual   Activity        Review of Systems  Constitutional, HEENT, cardiovascular, pulmonary, gi and gu systems are negative, except as otherwise noted.     Objective    Exam  /83 (BP Location: Left arm, Patient Position: Sitting, Cuff Size: Adult Large)   Pulse 84   Temp 97.3  F (36.3  C) (Temporal)   Resp 16   Ht 1.69 m (5' 6.54\")   Wt 104.1 kg (229 lb 9.6 oz)   SpO2 99%   BMI 36.46 kg/m     Estimated body mass index is 36.46 kg/m  as calculated from the following:    Height as of this encounter: 1.69 m (5' 6.54\").    Weight as of this encounter: 104.1 kg (229 lb 9.6 oz).    Physical Exam  GENERAL: alert and no distress  EYES: Eyes grossly normal to inspection, PERRL and conjunctivae and sclerae normal  HENT: ear canals and " TM's normal, nose and mouth without ulcers or lesions  NECK: no adenopathy, no asymmetry, masses, or scars  RESP: lungs clear to auscultation - no rales, rhonchi or wheezes  CV: regular rate and rhythm, normal S1 S2, no S3 or S4, no murmur, click or rub, no peripheral edema  ABDOMEN: soft, nontender, no hepatosplenomegaly, no masses and bowel sounds normal  MS: No tenderness or effusion.   SKIN: no suspicious lesions or rashes  NEURO: Normal strength and tone, mentation intact and speech normal  PSYCH: mentation appears normal, affect normal/bright    Signed Electronically by: ARTEMIO Martin CNP

## 2024-08-26 LAB
HPV HR 12 DNA CVX QL NAA+PROBE: NEGATIVE
HPV16 DNA CVX QL NAA+PROBE: NEGATIVE
HPV18 DNA CVX QL NAA+PROBE: POSITIVE
HUMAN PAPILLOMA VIRUS FINAL DIAGNOSIS: ABNORMAL

## 2024-08-28 LAB
BKR AP ASSOCIATED HPV REPORT: NORMAL
BKR LAB AP GYN ADEQUACY: NORMAL
BKR LAB AP GYN INTERPRETATION: NORMAL
BKR LAB AP PREVIOUS ABNL DX: NORMAL
BKR LAB AP PREVIOUS ABNORMAL: NORMAL
PATH REPORT.COMMENTS IMP SPEC: NORMAL
PATH REPORT.COMMENTS IMP SPEC: NORMAL
PATH REPORT.RELEVANT HX SPEC: NORMAL

## 2024-08-29 ENCOUNTER — TELEPHONE (OUTPATIENT)
Dept: FAMILY MEDICINE | Facility: CLINIC | Age: 49
End: 2024-08-29
Payer: COMMERCIAL

## 2024-08-29 ENCOUNTER — PATIENT OUTREACH (OUTPATIENT)
Dept: FAMILY MEDICINE | Facility: CLINIC | Age: 49
End: 2024-08-29
Payer: COMMERCIAL

## 2024-08-29 NOTE — TELEPHONE ENCOUNTER
"Called and scheduled patient colposcopy with [facility] on 09/16/2024. Writer used the same day blocks marked \" Other Procedure\".    Adi Bennett     "

## 2024-08-29 NOTE — TELEPHONE ENCOUNTER
Reason for Call:  Appointment Request    Patient requesting this type of appt: Procedure: Colposcopy    Requested provider:  Any female provider    Reason patient unable to be scheduled: Needs to be scheduled by clinic    When does patient want to be seen/preferred time:  Morning appoinmtent, in a week or two.    Comments:     Could we send this information to you in [facility] or would you prefer to receive a phone call?:   Patient would prefer a phone call   Okay to leave a detailed message?: Yes at Cell number on file:    Telephone Information:   Mobile [phone]       Call taken on 8/29/2024 at 9:54 AM by Mariajose Davidson

## 2024-09-16 ENCOUNTER — OFFICE VISIT (OUTPATIENT)
Dept: FAMILY MEDICINE | Facility: CLINIC | Age: 49
End: 2024-09-16
Payer: COMMERCIAL

## 2024-09-16 VITALS
TEMPERATURE: 97.5 F | WEIGHT: 230.25 LBS | OXYGEN SATURATION: 100 % | SYSTOLIC BLOOD PRESSURE: 137 MMHG | DIASTOLIC BLOOD PRESSURE: 84 MMHG | RESPIRATION RATE: 18 BRPM | HEART RATE: 93 BPM | BODY MASS INDEX: 36.56 KG/M2

## 2024-09-16 DIAGNOSIS — R87.810 CERVICAL HIGH RISK HPV (HUMAN PAPILLOMAVIRUS) TEST POSITIVE: Primary | ICD-10-CM

## 2024-09-16 PROCEDURE — 57421 EXAM/BIOPSY OF VAG W/SCOPE: CPT | Performed by: FAMILY MEDICINE

## 2024-09-16 ASSESSMENT — PAIN SCALES - GENERAL: PAINLEVEL: NO PAIN (0)

## 2024-09-16 NOTE — PROGRESS NOTES
Aunelza Daily is a 49 year old female who presents for initial colposcopy. Pap smear 1 months ago showed: normal with HR HPV 18. The prior pap showed normal with HR HPV other and 18.     Past Medical History:   Diagnosis Date    Hyperthyroidism 2012    mild Graves disease? Endocrinology-no tx     Family History   Problem Relation Age of Onset    Diabetes Mother     Hypertension Mother     Lipids Mother     Cerebrovascular Disease Maternal Grandmother     Neurologic Disorder Father         migraine    Family History Negative No family hx of     Cancer No family hx of     Thyroid Disease No family hx of     Glaucoma No family hx of     Macular Degeneration No family hx of     Asthma No family hx of     C.A.D. No family hx of     Endocrine Disease No family hx of     Heart Disease No family hx of     Psychotic Disorder No family hx of     Blood Disease No family hx of     Gastrointestinal Disease No family hx of             No LMP recorded. Patient is perimenopausal.  Type of contraception: None -- is she planning pregnancy? No  History   Smoking Status    Never   Smokeless Tobacco    Never     History of sexual abuse:  No  Allergies as of 09/16/2024    (No Known Allergies)        PROCEDURE:  Before the procedure, it was ensured that the patient was educated regarding the nature of her findings to date, the implications of them, and what was to be done. She has been made aware of the role of HPV, the natural history of infection, ways to minimize her future risk, the effect of HPV on the cervix, and treatment options available should they be indicated. The   pathophysiology of the cervix, including a discussion of squamous vs. endometrial cells, and squamous metaplasia have all been reviewed, using illustrations and sketches. The details of the colposcopic procedure were reviewed, as well as the risks of missed diagnoses, pain, infection and bleeding. All questions were answered before proceeding, and informed  consent was therefore obtained.    Bimanual examination: was performed and was unremarkable.  Unenhanced examination of the cervix was normal without lesions.  Pap smear and endocervical sampling not obtained due to:    not due  Pap repeated?:  No  SCJ seen?:  yes  Endocervical speculum needed?:  No  ECC done?:  No  Lugol's solution used?:  Yes normal uniform uptake  Satisfactory examination?:  yes    Vaginal vault: normal to cursory inspection normal  Urethra normal?:  yes  Labia normal?:  yes  Perineum normal?:  yes  Rectum normal?:  yes    FINDINGS:  Cervix: no visible lesions  Procedure: biopsies taken (not including ECC): 0.    Procedure summary: Patient tolerated procedure well     Assessment: Normal exam without visible pathology    Plan: Follow up in 6 months for pap with HPV testing.  Discussed contacting insurance about HPV vaccine coverage.     Cassie Sparks M.D.

## 2024-10-15 ENCOUNTER — PATIENT OUTREACH (OUTPATIENT)
Dept: FAMILY MEDICINE | Facility: CLINIC | Age: 49
End: 2024-10-15
Payer: COMMERCIAL

## 2025-02-25 ENCOUNTER — PATIENT OUTREACH (OUTPATIENT)
Dept: FAMILY MEDICINE | Facility: CLINIC | Age: 50
End: 2025-02-25
Payer: COMMERCIAL

## 2025-02-25 PROBLEM — R87.810 CERVICAL HIGH RISK HPV (HUMAN PAPILLOMAVIRUS) TEST POSITIVE: Status: ACTIVE | Noted: 2023-06-02

## 2025-04-09 ENCOUNTER — OFFICE VISIT (OUTPATIENT)
Dept: FAMILY MEDICINE | Facility: CLINIC | Age: 50
End: 2025-04-09
Payer: COMMERCIAL

## 2025-04-09 VITALS
HEIGHT: 67 IN | TEMPERATURE: 97.6 F | SYSTOLIC BLOOD PRESSURE: 139 MMHG | HEART RATE: 88 BPM | DIASTOLIC BLOOD PRESSURE: 92 MMHG | RESPIRATION RATE: 20 BRPM | OXYGEN SATURATION: 100 % | BODY MASS INDEX: 35.94 KG/M2 | WEIGHT: 229 LBS

## 2025-04-09 DIAGNOSIS — Z23 NEED FOR PNEUMOCOCCAL VACCINATION: ICD-10-CM

## 2025-04-09 DIAGNOSIS — Z12.4 CERVICAL CANCER SCREENING: Primary | ICD-10-CM

## 2025-04-09 DIAGNOSIS — Z23 NEED FOR SHINGLES VACCINE: ICD-10-CM

## 2025-04-09 PROCEDURE — 90472 IMMUNIZATION ADMIN EACH ADD: CPT | Performed by: NURSE PRACTITIONER

## 2025-04-09 PROCEDURE — 99213 OFFICE O/P EST LOW 20 MIN: CPT | Mod: 25 | Performed by: NURSE PRACTITIONER

## 2025-04-09 PROCEDURE — 90677 PCV20 VACCINE IM: CPT | Performed by: NURSE PRACTITIONER

## 2025-04-09 PROCEDURE — 1126F AMNT PAIN NOTED NONE PRSNT: CPT | Performed by: NURSE PRACTITIONER

## 2025-04-09 PROCEDURE — 90471 IMMUNIZATION ADMIN: CPT | Performed by: NURSE PRACTITIONER

## 2025-04-09 PROCEDURE — 3075F SYST BP GE 130 - 139MM HG: CPT | Performed by: NURSE PRACTITIONER

## 2025-04-09 PROCEDURE — 90750 HZV VACC RECOMBINANT IM: CPT | Performed by: NURSE PRACTITIONER

## 2025-04-09 PROCEDURE — 3080F DIAST BP >= 90 MM HG: CPT | Performed by: NURSE PRACTITIONER

## 2025-04-09 ASSESSMENT — PAIN SCALES - GENERAL: PAINLEVEL_OUTOF10: NO PAIN (0)

## 2025-04-09 NOTE — PATIENT INSTRUCTIONS
At [facility], we strive to deliver an exceptional experience to you, every time we see you. If you receive a survey, please let us know what we are doing well and/or what we could improve upon, as we do value your feedback.  If you have MyChart, you can expect to receive results automatically within 24 hours of their completion.  Your provider will send a note interpreting your results as well.   If you do not have MyChart, you should receive your results in about a week by mail.    Your care team:                            Family Medicine Internal Medicine   MD Javon Lombardo, MD Cassie Sparks, MD Oracio Frank, MD Soraya Charles, PA-C    Juaquin Nelson, MD Pediatrics   Edith Fisher, MD Elle Alvarez, MD Julia Nesbitt, APRN CNP Char ULLOA CNP   Beatriz Caldera, MD Ashley Taylor, MD Kaylie Weber, CNP     Radha Logan, PA-C Same-Day Provider (No follow-up visits)   ARTEMIO Magdaleno, SEAN Sylvester, PA-C    Ronit Aceves PA-C     Clinic hours: Monday - Thursday 7 am-6 pm; Fridays 7 am-5 pm.   Urgent care: Monday - Friday 10 am- 8 pm; Saturday and Sunday 9 am-5 pm.    Clinic: [phone]       [facility]: Monday - Thursday 8 am - 7 pm; Friday 8 am - 6 pm  [facility]: [phone]     Patient Education   Preventive Care Advice   This is general advice given by our system to help you stay healthy. However, your care team may have specific advice just for you. Please talk to your care team about your preventive care needs.  Nutrition  Eat 5 or more servings of fruits and vegetables each day.  Try wheat bread, brown rice and whole grain pasta (instead of white bread, rice, and pasta).  Get enough calcium and vitamin D. Check the label on foods and aim for 100% of the RDA (recommended daily allowance).  Lifestyle  Exercise at least 150 minutes each week  (30  minutes a day, 5 days a week).  Do muscle strengthening activities 2 days a week. These help control your weight and prevent disease.  No smoking.  Wear sunscreen to prevent skin cancer.  Have a dental exam and cleaning every 6 months.  Yearly exams  See your health care team every year to talk about:  Any changes in your health.  Any medicines your care team has prescribed.  Preventive care, family planning, and ways to prevent chronic diseases.  Shots (vaccines)   HPV shots (up to age 26), if you've never had them before.  Hepatitis B shots (up to age 59), if you've never had them before.  COVID-19 shot: Get this shot when it's due.  Flu shot: Get a flu shot every year.  Tetanus shot: Get a tetanus shot every 10 years.  Pneumococcal, hepatitis A, and RSV shots: Ask your care team if you need these based on your risk.  Shingles shot (for age 50 and up)  General health tests  Diabetes screening:  Starting at age 35, Get screened for diabetes at least every 3 years.  If you are younger than age 35, ask your care team if you should be screened for diabetes.  Cholesterol test: At age 39, start having a cholesterol test every 5 years, or more often if advised.  Bone density scan (DEXA): At age 50, ask your care team if you should have this scan for osteoporosis (brittle bones).  Hepatitis C: Get tested at least once in your life.  STIs (sexually transmitted infections)  Before age 24: Ask your care team if you should be screened for STIs.  After age 24: Get screened for STIs if you're at risk. You are at risk for STIs (including HIV) if:  You are sexually active with more than one person.  You don't use condoms every time.  You or a partner was diagnosed with a sexually transmitted infection.  If you are at risk for HIV, ask about PrEP medicine to prevent HIV.  Get tested for HIV at least once in your life, whether you are at risk for HIV or not.  Cancer screening tests  Cervical cancer screening: If you have a cervix,  begin getting regular cervical cancer screening tests starting at age 21.  Breast cancer scan (mammogram): If you've ever had breasts, begin having regular mammograms starting at age 40. This is a scan to check for breast cancer.  Colon cancer screening: It is important to start screening for colon cancer at age 45.  Have a colonoscopy test every 10 years (or more often if you're at risk) Or, ask your provider about stool tests like a FIT test every year or Cologuard test every 3 years.  To learn more about your testing options, visit:   .  For help making a decision, visit:   https://bit.ly/zh49124.  Prostate cancer screening test: If you have a prostate, ask your care team if a prostate cancer screening test (PSA) at age 55 is right for you.  Lung cancer screening: If you are a current or former smoker ages 50 to 80, ask your care team if ongoing lung cancer screenings are right for you.  For informational purposes only. Not to replace the advice of your health care provider. Copyright   2023 [employer]. All rights reserved. Clinically reviewed by the [facility] Transitions Program. [employer] [id number] - REV 01/24.

## 2025-04-09 NOTE — PROGRESS NOTES
"  Assessment & Plan     Cervical cancer screening  Hx of +HPV  - HPV and Gynecologic Cytology Panel - Recommended Age 30 - 65 Years  - REVIEW OF HEALTH MAINTENANCE PROTOCOL ORDERS    Need for pneumococcal vaccination  - Pneumococcal 20 Valent Conjugate (PCV20)    Need for shingles vaccine  - ZOSTER RECOMBINANT ADJUVANTED (SHINGRIX)    BMI  Estimated body mass index is 36.36 kg/m  as calculated from the following:    Height as of this encounter: 1.69 m (5' 6.54\").    Weight as of this encounter: 103.9 kg (229 lb).             Subjective   Aunty is a 49 year old, presenting for the following health issues:  Gyn Exam        4/9/2025     7:13 AM   Additional Questions   Roomed by Slim   Accompanied by Self     History of Present Illness       Reason for visit:  Physical    She eats 2-3 servings of fruits and vegetables daily.She consumes 0 sweetened beverage(s) daily.She exercises with enough effort to increase her heart rate 9 or less minutes per day.  She exercises with enough effort to increase her heart rate 3 or less days per week.   She is taking medications regularly.                  Review of Systems        Objective    BP (!) 157/95 (BP Location: Left arm, Patient Position: Chair, Cuff Size: Adult Large)   Pulse 88   Temp 97.6  F (36.4  C) (Temporal)   Resp 20   Ht 1.69 m (5' 6.54\")   Wt 103.9 kg (229 lb)   SpO2 100%   BMI 36.36 kg/m    Body mass index is 36.36 kg/m .  Physical Exam   GENERAL: alert and no distress  RESP: lungs clear to auscultation - no rales, rhonchi or wheezes   (female) w/bimanual: normal female external genitalia, normal urethral meatus, normal vaginal mucosa, normal cervix/adnexa/uterus without masses or discharge  SKIN: no suspicious lesions or rashes  PSYCH: mentation appears normal, affect normal/bright            Signed Electronically by: ARTEMIO Magdaleno CNP    "

## 2025-04-09 NOTE — NURSING NOTE
Prior to immunization administration, verified patients identity using patient s name and date of birth. Please see Immunization Activity for additional information.     Screening Questionnaire for Adult Immunization    Are you sick today?   No   Do you have allergies to medications, food, a vaccine component or latex?   No   Have you ever had a serious reaction after receiving a vaccination?   No   Do you have a long-term health problem with heart, lung, kidney, or metabolic disease (e.g., diabetes), asthma, a blood disorder, no spleen, complement component deficiency, a cochlear implant, or a spinal fluid leak?  Are you on long-term aspirin therapy?   No   Do you have cancer, leukemia, HIV/AIDS, or any other immune system problem?   No   Do you have a parent, brother, or sister with an immune system problem?   No   In the past 3 months, have you taken medications that affect  your immune system, such as prednisone, other steroids, or anticancer drugs; drugs for the treatment of rheumatoid arthritis, Crohn s disease, or psoriasis; or have you had radiation treatments?   No   Have you had a seizure, or a brain or other nervous system problem?   No   During the past year, have you received a transfusion of blood or blood    products, or been given immune (gamma) globulin or antiviral drug?   No   For women: Are you pregnant or is there a chance you could become       pregnant during the next month?   No   Have you received any vaccinations in the past 4 weeks?   No     Immunization questionnaire answers were all negative.        Patient instructed to remain in clinic for 15 minutes afterwards, and to report any adverse reactions.     Screening performed by Lexy Myers MA on 4/9/2025 at 8:43 AM.

## 2025-04-14 LAB
BKR AP ASSOCIATED HPV REPORT: ABNORMAL
BKR LAB AP GYN ADEQUACY: ABNORMAL
BKR LAB AP GYN INTERPRETATION: ABNORMAL
BKR LAB AP PREVIOUS ABNL DX: ABNORMAL
BKR LAB AP PREVIOUS ABNORMAL: ABNORMAL
PATH REPORT.COMMENTS IMP SPEC: ABNORMAL
PATH REPORT.COMMENTS IMP SPEC: ABNORMAL
PATH REPORT.RELEVANT HX SPEC: ABNORMAL

## 2025-04-15 ENCOUNTER — PATIENT OUTREACH (OUTPATIENT)
Dept: OBGYN | Facility: CLINIC | Age: 50
End: 2025-04-15
Payer: COMMERCIAL

## 2025-07-21 ENCOUNTER — PATIENT OUTREACH (OUTPATIENT)
Dept: CARE COORDINATION | Facility: CLINIC | Age: 50
End: 2025-07-21
Payer: COMMERCIAL

## 2025-07-23 ENCOUNTER — OFFICE VISIT (OUTPATIENT)
Dept: FAMILY MEDICINE | Facility: CLINIC | Age: 50
End: 2025-07-23
Payer: COMMERCIAL

## 2025-07-23 ENCOUNTER — PATIENT OUTREACH (OUTPATIENT)
Dept: CARE COORDINATION | Facility: CLINIC | Age: 50
End: 2025-07-23

## 2025-07-23 VITALS
HEART RATE: 96 BPM | HEIGHT: 67 IN | RESPIRATION RATE: 18 BRPM | OXYGEN SATURATION: 100 % | WEIGHT: 237 LBS | SYSTOLIC BLOOD PRESSURE: 137 MMHG | BODY MASS INDEX: 37.2 KG/M2 | TEMPERATURE: 98.2 F | DIASTOLIC BLOOD PRESSURE: 94 MMHG

## 2025-07-23 DIAGNOSIS — R87.810 CERVICAL HIGH RISK HPV (HUMAN PAPILLOMAVIRUS) TEST POSITIVE: Primary | ICD-10-CM

## 2025-07-23 NOTE — COMMUNITY RESOURCES LIST (ENGLISH)
Food  Food Helpline   Program Provider: [employer]  Program Website : https://www.hungersoFreta.lÃ¡ions.org/programs/[place]-food-helpline/  Next Steps: get more info https://www.hungersolutions.org/programs/mn-food-helpline/how-can-we-help-you/    Program Locations:   Address:  [address] [place] [id number]   Distance:  2.53 mi     Hours:   Monday: 8:00 AM - 5:00 PM   Tuesday: 8:00 AM - 5:00 PM   Wednesday: 8:00 AM - 5:00 PM   Thursday: 8:00 AM - 5:00 PM   Friday: 8:00 AM - 5:00 PM   Saturday: CLOSED   Sunday: CLOSED     Reduced Cost Groceries   Program Provider: [employer]  Program Website : https://[employer].org/groceries/fare-for-all/  Next Steps: Go to https://[employer].org/groceries/fare-for-all/schedule/    Program Locations:   Address:  [address] [place] [address]   Distance:  2.53 mi     Hours:   Monday: 9:00 AM - 5:00 PM   Tuesday: 9:00 AM - 5:00 PM   Wednesday: 9:00 AM - 5:00 PM   Thursday: 9:00 AM - 5:00 PM   Friday: 9:00 AM - 5:00 PM   Saturday: CLOSED   Sunday: CLOSED     Meal Delivery   Program Provider: [employer] [facility]  Program Website : [email or web address]/get-[email or web address]  Program Phone Number: [phone]  Next Steps: Call [phone]    Program Locations:   Address:  [address] [place] [address]   Distance:  7.55 mi   Office Phone Number: [phone]    Hours:   Monday: 8:00 AM - 5:00 PM   Tuesday: 8:00 AM - 5:00 PM   Wednesday: 8:00 AM - 5:00 PM   Thursday: 8:00 AM - 5:00 PM   Friday: 8:00 AM - 5:00 PM   Saturday: CLOSED   Sunday: CLOSED     Supplemental Nutrition Assistance Program (SNAP) Outreach   Program Provider: [facility]  Program Website : https://www.arvest.org/who--how-we-help/services-and-programs/programs/snap-outreach.html#.GtgmACWFe5r  Program Phone Number: [phone]  Next Steps: Go to  https://www.arvest.org/who--how-we-help/services-and-programs/programs/snap-outreach.html#.QponWKOTx9i    Program Locations:   Address:  [address] [place] [address]   Distance:  14.54 mi   Office Phone Number: [phone]    Hours:   Monday: 8:00 AM - 4:30 PM   Tuesday: 8:00 AM - 4:30 PM   Wednesday: 8:00 AM - 4:30 PM   Thursday: 8:00 AM - 4:30 PM   Friday: 8:00 AM - 4:30 PM   Saturday: CLOSED   Sunday: CLOSED

## 2025-07-23 NOTE — COMMUNITY RESOURCES LIST (ENGLISH)
Food  Food Helpline   Program Provider: [employer]  Program Website : https://www.hungersoTrending Tasteions.org/programs/mn-food-helpline/  Next Steps: get more info https://www.hungersolutions.org/programs/mn-food-helpline/how-can-we-help-you/    Program Locations:   Address:  [address] [place] [address]   Distance:  2.53 mi     Hours:   Monday: 8:00 AM - 5:00 PM   Tuesday: 8:00 AM - 5:00 PM   Wednesday: 8:00 AM - 5:00 PM   Thursday: 8:00 AM - 5:00 PM   Friday: 8:00 AM - 5:00 PM   Saturday: CLOSED   Sunday: CLOSED     Reduced Cost Groceries   Program Provider: Fare For All  Program Website : https://Somaxon Pharmaceuticals.org/groceries/fare-for-all/  Next Steps: Go to https://Somaxon Pharmaceuticals.org/groceries/fare-for-all/schedule/    Program Locations:   Address:  [address] [place] [address]   Distance:  2.53 mi     Hours:   Monday: 9:00 AM - 5:00 PM   Tuesday: 9:00 AM - 5:00 PM   Wednesday: 9:00 AM - 5:00 PM   Thursday: 9:00 AM - 5:00 PM   Friday: 9:00 AM - 5:00 PM   Saturday: CLOSED   Sunday: CLOSED     Meal Delivery   Program Provider: The Hitch [employer] evly [place]  Program Website : https://mealsevlyonevlywheels.goOutMap/get-meals/  Program Phone Number: [phone]  Next Steps: Call [phone]    Program Locations:   Address:  [address] [place] [address]   Distance:  7.55 mi   Office Phone Number: [phone]    Hours:   Monday: 8:00 AM - 5:00 PM   Tuesday: 8:00 AM - 5:00 PM   Wednesday: 8:00 AM - 5:00 PM   Thursday: 8:00 AM - 5:00 PM   Friday: 8:00 AM - 5:00 PM   Saturday: CLOSED   Sunday: CLOSED     Supplemental Nutrition Assistance Program (SNAP) Outreach   Program Provider: Second [facility]  Program Website : https://www.arvest.org/who--how-we-help/services-and-programs/programs/snap-outreach.html#.CbuuUGEDc8h  Program Phone Number: [phone]  Next Steps: Go to  https://www.arvest.org/who--how-we-help/services-and-programs/programs/snap-outreach.html#.GiuzCNYKu4z    Program Locations:   Address:  [address] [place] [address]   Distance:  14.54 mi   Office Phone Number: [phone]    Hours:   Monday: 8:00 AM - 4:30 PM   Tuesday: 8:00 AM - 4:30 PM   Wednesday: 8:00 AM - 4:30 PM   Thursday: 8:00 AM - 4:30 PM   Friday: 8:00 AM - 4:30 PM   Saturday: CLOSED   Sunday: CLOSED

## 2025-07-23 NOTE — COMMUNITY RESOURCES LIST (ENGLISH)
Food  Food Helpline   Program Provider: [employer]  Program Website : https://www.hungersoOP3Nvoiceions.org/programs/mn-food-helpline/  Next Steps: get more info https://www.hungersolutions.org/programs/mn-food-helpline/how-can-we-help-you/    Program Locations:   Address:  [address] [place] [address]   Distance:  2.53 mi     Hours:   Monday: 8:00 AM - 5:00 PM   Tuesday: 8:00 AM - 5:00 PM   Wednesday: 8:00 AM - 5:00 PM   Thursday: 8:00 AM - 5:00 PM   Friday: 8:00 AM - 5:00 PM   Saturday: CLOSED   Sunday: CLOSED     Reduced Cost Groceries   Program Provider: Fare For All  Program Website : https://Precision Optics.org/groceries/fare-for-all/  Next Steps: Go to https://Precision Optics.org/groceries/fare-for-all/schedule/    Program Locations:   Address:  [address] [place] [address]   Distance:  2.53 mi     Hours:   Monday: 9:00 AM - 5:00 PM   Tuesday: 9:00 AM - 5:00 PM   Wednesday: 9:00 AM - 5:00 PM   Thursday: 9:00 AM - 5:00 PM   Friday: 9:00 AM - 5:00 PM   Saturday: CLOSED   Sunday: CLOSED     Meal Delivery   Program Provider: Meda[employer] On [employer] [facility]  Program Website : https://mealsMyTable Restaurant ReservationsonMyTable Restaurant Reservationswheels.Harbor Technologies/get-meals/  Program Phone Number: [phone]  Next Steps: Call [phone]    Program Locations:   Address:  [address] [place] [address]   Distance:  7.55 mi   Office Phone Number: [phone]    Hours:   Monday: 8:00 AM - 5:00 PM   Tuesday: 8:00 AM - 5:00 PM   Wednesday: 8:00 AM - 5:00 PM   Thursday: 8:00 AM - 5:00 PM   Friday: 8:00 AM - 5:00 PM   Saturday: CLOSED   Sunday: CLOSED     Supplemental Nutrition Assistance Program (SNAP) Outreach   Program Provider: Second [facility]  Program Website : https://www.arvest.org/who--how-we-help/services-and-programs/programs/snap-outreach.html#.HkkwEJCHo8k  Program Phone Number: [phone]  Next Steps: Go to  https://www.arvest.org/who--how-we-help/services-and-programs/programs/snap-outreach.html#.PxkwKMDNr4g    Program Locations:   Address:  [address] [place] [address]   Distance:  14.54 mi   Office Phone Number: [phone]    Hours:   Monday: 8:00 AM - 4:30 PM   Tuesday: 8:00 AM - 4:30 PM   Wednesday: 8:00 AM - 4:30 PM   Thursday: 8:00 AM - 4:30 PM   Friday: 8:00 AM - 4:30 PM   Saturday: CLOSED   Sunday: CLOSED

## 2025-07-23 NOTE — COMMUNITY RESOURCES LIST (ENGLISH)
Food  Food Helpline   Program Provider: [employer]  Program Website : https://www.hungersoAlaMarkaions.org/programs/mn-food-helpline/  Next Steps: get more info https://www.hungersolutions.org/programs/mn-food-helpline/how-can-we-help-you/    Program Locations:   Address:  [address] [place] [address]   Distance:  2.53 mi     Hours:   Monday: 8:00 AM - 5:00 PM   Tuesday: 8:00 AM - 5:00 PM   Wednesday: 8:00 AM - 5:00 PM   Thursday: 8:00 AM - 5:00 PM   Friday: 8:00 AM - 5:00 PM   Saturday: CLOSED   Sunday: CLOSED     Reduced Cost Groceries   Program Provider: Fare For All  Program Website : https://Conduit Labs.org/groceries/fare-for-all/  Next Steps: Go to https://Conduit Labs.org/groceries/fare-for-all/schedule/    Program Locations:   Address:  [address] [place] [address]   Distance:  2.53 mi     Hours:   Monday: 9:00 AM - 5:00 PM   Tuesday: 9:00 AM - 5:00 PM   Wednesday: 9:00 AM - 5:00 PM   Thursday: 9:00 AM - 5:00 PM   Friday: 9:00 AM - 5:00 PM   Saturday: CLOSED   Sunday: CLOSED     Meal Delivery   Program Provider: [employer] On pushds "[employer] [facility]  Program Website : https://meals"MoveableCode[employer] Inc."on"MoveableCode[employer] Inc."wheels.Cognovant/get-meals/  Program Phone Number: [phone]  Next Steps: Call [phone]    Program Locations:   Address:  [address] [place] [address]   Distance:  7.55 mi   Office Phone Number: [phone]    Hours:   Monday: 8:00 AM - 5:00 PM   Tuesday: 8:00 AM - 5:00 PM   Wednesday: 8:00 AM - 5:00 PM   Thursday: 8:00 AM - 5:00 PM   Friday: 8:00 AM - 5:00 PM   Saturday: CLOSED   Sunday: CLOSED     Supplemental Nutrition Assistance Program (SNAP) Outreach   Program Provider: Second [facility]  Program Website : https://www.arvest.org/who--how-we-help/services-and-programs/programs/snap-outreach.html#.XpsaRIUWg6y  Program Phone Number: [phone]  Next Steps: Go to  https://www.arvest.org/who--how-we-help/services-and-programs/programs/snap-outreach.html#.UyioGIOSj8m    Program Locations:   Address:  [address] [place] [address]   Distance:  14.54 mi   Office Phone Number: [phone]    Hours:   Monday: 8:00 AM - 4:30 PM   Tuesday: 8:00 AM - 4:30 PM   Wednesday: 8:00 AM - 4:30 PM   Thursday: 8:00 AM - 4:30 PM   Friday: 8:00 AM - 4:30 PM   Saturday: CLOSED   Sunday: CLOSED

## 2025-07-23 NOTE — PROGRESS NOTES
"COLPOSCOPY PROCEDURE NOTE    Stuart Daily is a 50 year old female  who is presents for colposcopy.    HISTORY  Age: 50 year old Patient's last menstrual period was 2025.  Indication: Normal and +hrHPV  Previous abnormal screening results: HRHPV 18  Previous abnormal colpo: Normal  Previous treatment: none  Received HPV vaccine?: No  Current contraception: abstinence  History of other GYN infections: none known  Tobacco use: No  Allergic to latex: No  Allergic to Iodine or Betadine: No    LAB  A pregnancy test Not completed    PRE-PROCEDURE  The nature and meaning of PAP smears discussed: Yes  HPV infection in relation to PAP smear changes discussed: Yes  Cervical dysplasia and its significance and natural history discussed: Yes  Colposcopy procedure explained: Yes  Side effects, risks and complications discussed (including risk of bleeding,  infection, non-diagnostic colposcopy result): Yes    PROCEDURE NOTE    Vitals:    25 1106 25 1108   BP: (!) 150/89 (!) 137/94   BP Location: Left arm Right arm   Patient Position: Sitting Sitting   Cuff Size: Adult Large Adult Large   Pulse: 96    Resp: 18    Temp: 98.2  F (36.8  C)    TempSrc: Oral    SpO2: 100%    Weight: 107.5 kg (237 lb)    Height: 1.689 m (5' 6.5\")        The vulva, vagina, and perianal area were examined with findings of no gross abnormalities.    A speculum was placed and the cervix was painted with acetic acid. The  following findings were noted:  Cervix: fully visualized  Squamocolumnar junction: fully visualized  Acetowhite changes: No  Lesion(s) present (acetowhite or other): No    Lesion description  None    Features:  Low grade features: none noted    Impression:Normal/Benign findings  Anesthesia: none  Endocervical curettage performed: yes  Cervical biopsies obtained: none  Random biopsies done: No  Other biopsies: None  Hemostasis: fulguration    PLAN:   Results will be communicated with the patient via " Nina  Discussed the importance of appropriate follow-up: Yes  Diagnosis added to problem list, medical history, surgical history as indicated: Yes  Likely recommendation of yearly HRHPV with cytology.      Signed Electronically by: Cassie Gayle MD

## 2025-07-23 NOTE — COMMUNITY RESOURCES LIST (ENGLISH)
Food  Food Helpline   Program Provider: [employer]  Program Website : https://www.hungersoUReservions.org/programs/mn-food-helpline/  Next Steps: get more info https://www.hungersolutions.org/programs/mn-food-helpline/how-can-we-help-you/    Program Locations:   Address:  [address] [place] [address]   Distance:  2.53 mi     Hours:   Monday: 8:00 AM - 5:00 PM   Tuesday: 8:00 AM - 5:00 PM   Wednesday: 8:00 AM - 5:00 PM   Thursday: 8:00 AM - 5:00 PM   Friday: 8:00 AM - 5:00 PM   Saturday: CLOSED   Sunday: CLOSED     Reduced Cost Groceries   Program Provider: Fare For All  Program Website : https://Fresh Interactive Technologies.org/groceries/fare-for-all/  Next Steps: Go to https://Fresh Interactive Technologies.org/groceries/fare-for-all/schedule/    Program Locations:   Address:  [address] [place] [address]   Distance:  2.53 mi     Hours:   Monday: 9:00 AM - 5:00 PM   Tuesday: 9:00 AM - 5:00 PM   Wednesday: 9:00 AM - 5:00 PM   Thursday: 9:00 AM - 5:00 PM   Friday: 9:00 AM - 5:00 PM   Saturday: CLOSED   Sunday: CLOSED     Meal Delivery   Program Provider: [employer] On [employer] [facility]  Program Website : https://mealsGood Times RestaurantsonGood Times Restaurantswheels.Akella/get-meals/  Program Phone Number: [phone]  Next Steps: Call [phone]    Program Locations:   Address:  [address] [place] [address]   Distance:  7.55 mi   Office Phone Number: [phone]    Hours:   Monday: 8:00 AM - 5:00 PM   Tuesday: 8:00 AM - 5:00 PM   Wednesday: 8:00 AM - 5:00 PM   Thursday: 8:00 AM - 5:00 PM   Friday: 8:00 AM - 5:00 PM   Saturday: CLOSED   Sunday: CLOSED     Supplemental Nutrition Assistance Program (SNAP) Outreach   Program Provider: Second [facility]  Program Website : https://www.arvest.org/who--how-we-help/services-and-programs/programs/snap-outreach.html#.BuwqCCTMq0l  Program Phone Number: [phone]  Next Steps: Go to  https://www.arvest.org/who--how-we-help/services-and-programs/programs/snap-outreach.html#.PrpeEVMAy0r    Program Locations:   Address:  [address] [place] [address]   Distance:  14.54 mi   Office Phone Number: [phone]    Hours:   Monday: 8:00 AM - 4:30 PM   Tuesday: 8:00 AM - 4:30 PM   Wednesday: 8:00 AM - 4:30 PM   Thursday: 8:00 AM - 4:30 PM   Friday: 8:00 AM - 4:30 PM   Saturday: CLOSED   Sunday: CLOSED

## 2025-07-23 NOTE — COMMUNITY RESOURCES LIST (ENGLISH)
Food  Food Helpline   Program Provider: [employer]  Program Website : https://www.hungersoAvillionions.org/programs/mn-food-helpline/  Next Steps: get more info https://www.hungersolutions.org/programs/mn-food-helpline/how-can-we-help-you/    Program Locations:   Address:  [address] [place] [address]   Distance:  2.53 mi     Hours:   Monday: 8:00 AM - 5:00 PM   Tuesday: 8:00 AM - 5:00 PM   Wednesday: 8:00 AM - 5:00 PM   Thursday: 8:00 AM - 5:00 PM   Friday: 8:00 AM - 5:00 PM   Saturday: CLOSED   Sunday: CLOSED     Reduced Cost Groceries   Program Provider: Fare For All  Program Website : https://ALEXANDALEXA.org/groceries/fare-for-all/  Next Steps: Go to https://ALEXANDALEXA.org/groceries/fare-for-all/schedule/    Program Locations:   Address:  [address] [place] [address]   Distance:  2.53 mi     Hours:   Monday: 9:00 AM - 5:00 PM   Tuesday: 9:00 AM - 5:00 PM   Wednesday: 9:00 AM - 5:00 PM   Thursday: 9:00 AM - 5:00 PM   Friday: 9:00 AM - 5:00 PM   Saturday: CLOSED   Sunday: CLOSED     Meal Delivery   Program Provider: [employer] On [employer] [facility]  Program Website : https://mealsDeskideaonDeskideawheels.FirePower Technology/get-meals/  Program Phone Number: [phone]  Next Steps: Call [phone]    Program Locations:   Address:  [address] [place] [address]   Distance:  7.55 mi   Office Phone Number: [phone]    Hours:   Monday: 8:00 AM - 5:00 PM   Tuesday: 8:00 AM - 5:00 PM   Wednesday: 8:00 AM - 5:00 PM   Thursday: 8:00 AM - 5:00 PM   Friday: 8:00 AM - 5:00 PM   Saturday: CLOSED   Sunday: CLOSED     Supplemental Nutrition Assistance Program (SNAP) Outreach   Program Provider: Second [facility]  Program Website : https://www.arvest.org/who--how-we-help/services-and-programs/programs/snap-outreach.html#.ZllkIIXXi1c  Program Phone Number: [phone]  Next Steps: Go to  https://www.arvest.org/who--how-we-help/services-and-programs/programs/snap-outreach.html#.ZegjHJGNd3t    Program Locations:   Address:  [address] [place] [address]   Distance:  14.54 mi   Office Phone Number: [phone]    Hours:   Monday: 8:00 AM - 4:30 PM   Tuesday: 8:00 AM - 4:30 PM   Wednesday: 8:00 AM - 4:30 PM   Thursday: 8:00 AM - 4:30 PM   Friday: 8:00 AM - 4:30 PM   Saturday: CLOSED   Sunday: CLOSED

## 2025-07-23 NOTE — COMMUNITY RESOURCES LIST (ENGLISH)
Food  Food Helpline   Program Provider: [employer]  Program Website : https://www.hungersoProfitBricksions.org/programs/mn-food-helpline/  Next Steps: get more info https://www.hungersolutions.org/programs/mn-food-helpline/how-can-we-help-you/    Program Locations:   Address:  [address] [place] [address]   Distance:  2.53 mi     Hours:   Monday: 8:00 AM - 5:00 PM   Tuesday: 8:00 AM - 5:00 PM   Wednesday: 8:00 AM - 5:00 PM   Thursday: 8:00 AM - 5:00 PM   Friday: 8:00 AM - 5:00 PM   Saturday: CLOSED   Sunday: CLOSED     Reduced Cost Groceries   Program Provider: Fare For All  Program Website : https://Nomos Software.org/groceries/fare-for-all/  Next Steps: Go to https://Nomos Software.org/groceries/fare-for-all/schedule/    Program Locations:   Address:  [address] [place] [address]   Distance:  2.53 mi     Hours:   Monday: 9:00 AM - 5:00 PM   Tuesday: 9:00 AM - 5:00 PM   Wednesday: 9:00 AM - 5:00 PM   Thursday: 9:00 AM - 5:00 PM   Friday: 9:00 AM - 5:00 PM   Saturday: CLOSED   Sunday: CLOSED     Meal Delivery   Program Provider: Upside On [employer]ND[employer]UREs [employer] [facility]  Program Website : https://mealsOjOs.comonOjOs.comwheels.SkyRiver Technology Solutions/get-meals/  Program Phone Number: [phone]  Next Steps: Call [phone]    Program Locations:   Address:  [address] [place] [address]   Distance:  7.55 mi   Office Phone Number: [phone]    Hours:   Monday: 8:00 AM - 5:00 PM   Tuesday: 8:00 AM - 5:00 PM   Wednesday: 8:00 AM - 5:00 PM   Thursday: 8:00 AM - 5:00 PM   Friday: 8:00 AM - 5:00 PM   Saturday: CLOSED   Sunday: CLOSED     Supplemental Nutrition Assistance Program (SNAP) Outreach   Program Provider: Second [facility]  Program Website : https://www.arvest.org/who--how-we-help/services-and-programs/programs/snap-outreach.html#.NocfLICSc2o  Program Phone Number: [phone]  Next Steps: Go to  https://www.arvest.org/who--how-we-help/services-and-programs/programs/snap-outreach.html#.EbqqWBHSc9v    Program Locations:   Address:  [address] [place] [address]   Distance:  14.54 mi   Office Phone Number: [phone]    Hours:   Monday: 8:00 AM - 4:30 PM   Tuesday: 8:00 AM - 4:30 PM   Wednesday: 8:00 AM - 4:30 PM   Thursday: 8:00 AM - 4:30 PM   Friday: 8:00 AM - 4:30 PM   Saturday: CLOSED   Sunday: CLOSED

## 2025-07-23 NOTE — PATIENT INSTRUCTIONS
At [facility], we strive to deliver an exceptional experience to you, every time we see you. If you receive a survey, please let us know what we are doing well and/or what we could improve upon, as we do value your feedback.  If you have MyChart, you can expect to receive results automatically within 24 hours of their completion.  Your provider will send a note interpreting your results as well.   If you do not have MyChart, you should receive your results in about a week by mail.    Your care team:                            Family Medicine Internal Medicine   MD Javon Lombardo, MD Cassie Sparks, MD Oracio Frank, MD Soraya Charles, PA-C ARTEMIO Magdaleno, SEAN Nelson, MD Pediatrics   MD Elle Reyes, MD Radha Logan, PABETH Garcia APRN CNP   MD Ashley Castro, MD Kaylei Weber, CNP      Same-Day Provider (No follow-up visits)    MMUTAZ Arce PA-C     Clinic hours: Monday - Thursday 7 am-6 pm; Fridays 7 am-5 pm.   Urgent care: Monday - Friday 10 am- 8 pm; Saturday and Sunday 9 am-5 pm.    Clinic: [phone]       [facility]: Monday - Thursday 8 am - 7 pm; Friday 8 am - 6 pm  [facility]: [phone]     [facility] Scheduling: Monday - Friday 7 am - 7 pm; Saturday 7 am - 3:30 pm  ([phone]) [place] : [phone]

## 2025-07-23 NOTE — COMMUNITY RESOURCES LIST (ENGLISH)
Food  Food Helpline   Program Provider: [employer]  Program Website : https://www.hungersoOmnidriveions.org/programs/mn-food-helpline/  Next Steps: get more info https://www.hungersolutions.org/programs/mn-food-helpline/how-can-we-help-you/    Program Locations:   Address:  [address] [place] [address]   Distance:  2.53 mi     Hours:   Monday: 8:00 AM - 5:00 PM   Tuesday: 8:00 AM - 5:00 PM   Wednesday: 8:00 AM - 5:00 PM   Thursday: 8:00 AM - 5:00 PM   Friday: 8:00 AM - 5:00 PM   Saturday: CLOSED   Sunday: CLOSED     Reduced Cost Groceries   Program Provider: Fare For All  Program Website : https://Sykio.org/groceries/fare-for-all/  Next Steps: Go to https://Sykio.org/groceries/fare-for-all/schedule/    Program Locations:   Address:  [address] [place] [address]   Distance:  2.53 mi     Hours:   Monday: 9:00 AM - 5:00 PM   Tuesday: 9:00 AM - 5:00 PM   Wednesday: 9:00 AM - 5:00 PM   Thursday: 9:00 AM - 5:00 PM   Friday: 9:00 AM - 5:00 PM   Saturday: CLOSED   Sunday: CLOSED     Meal Delivery   Program Provider: [employer] On [employer] [facility]  Program Website : https://mealsSomnoMedonSomnoMedwheels.Blue Vector Systems/get-meals/  Program Phone Number: [phone]  Next Steps: Call [phone]    Program Locations:   Address:  [address] [place] [address]   Distance:  7.55 mi   Office Phone Number: [phone]    Hours:   Monday: 8:00 AM - 5:00 PM   Tuesday: 8:00 AM - 5:00 PM   Wednesday: 8:00 AM - 5:00 PM   Thursday: 8:00 AM - 5:00 PM   Friday: 8:00 AM - 5:00 PM   Saturday: CLOSED   Sunday: CLOSED     Supplemental Nutrition Assistance Program (SNAP) Outreach   Program Provider: Second [facility]  Program Website : https://www.arvest.org/who--how-we-help/services-and-programs/programs/snap-outreach.html#.FbrpNPRPe2i  Program Phone Number: [phone]  Next Steps: Go to  https://www.arvest.org/who--how-we-help/services-and-programs/programs/snap-outreach.html#.KuiyWSDWf8v    Program Locations:   Address:  [address] [place] [address]   Distance:  14.54 mi   Office Phone Number: [phone]    Hours:   Monday: 8:00 AM - 4:30 PM   Tuesday: 8:00 AM - 4:30 PM   Wednesday: 8:00 AM - 4:30 PM   Thursday: 8:00 AM - 4:30 PM   Friday: 8:00 AM - 4:30 PM   Saturday: CLOSED   Sunday: CLOSED

## 2025-07-23 NOTE — COMMUNITY RESOURCES LIST (ENGLISH)
Food  Food Helpline   Program Provider: [employer] Group  Program Website : https://www.hungersoCloudAcademyions.org/programs/mn-food-helpline/  Next Steps: get more info https://www.hungersolutions.org/programs/mn-food-helpline/how-can-we-help-you/    Program Locations:   Address:  [address] [place] [address]   Distance:  2.53 mi     Hours:   Monday: 8:00 AM - 5:00 PM   Tuesday: 8:00 AM - 5:00 PM   Wednesday: 8:00 AM - 5:00 PM   Thursday: 8:00 AM - 5:00 PM   Friday: 8:00 AM - 5:00 PM   Saturday: CLOSED   Sunday: CLOSED     Reduced Cost Groceries   Program Provider: Fare For All  Program Website : https://Shopcliq.org/groceries/fare-for-all/  Next Steps: Go to https://Shopcliq.org/groceries/fare-for-all/schedule/    Program Locations:   Address:  [address] [place] [address]   Distance:  2.53 mi     Hours:   Monday: 9:00 AM - 5:00 PM   Tuesday: 9:00 AM - 5:00 PM   Wednesday: 9:00 AM - 5:00 PM   Thursday: 9:00 AM - 5:00 PM   Friday: 9:00 AM - 5:00 PM   Saturday: CLOSED   Sunday: CLOSED     Meal Delivery   Program Provider: Plastic J[employer] On [employer] [facility]  Program Website : https://mealsCloudSwitchonCloudSwitchwheels.two.42.solutions/get-meals/  Program Phone Number: [phone]  Next Steps: Call [phone]    Program Locations:   Address:  [address] [place] [address]   Distance:  7.55 mi   Office Phone Number: [phone]    Hours:   Monday: 8:00 AM - 5:00 PM   Tuesday: 8:00 AM - 5:00 PM   Wednesday: 8:00 AM - 5:00 PM   Thursday: 8:00 AM - 5:00 PM   Friday: 8:00 AM - 5:00 PM   Saturday: CLOSED   Sunday: CLOSED     Supplemental Nutrition Assistance Program (SNAP) Outreach   Program Provider: Second [facility]  Program Website : https://www.arvest.org/who--how-we-help/services-and-programs/programs/snap-outreach.html#.FouzAMARb6n  Program Phone Number: [phone]  Next Steps: Go to  https://www.arvest.org/who--how-we-help/services-and-programs/programs/snap-outreach.html#.DptzXSXAv1d    Program Locations:   Address:  [address] [place] [address]   Distance:  14.54 mi   Office Phone Number: [phone]    Hours:   Monday: 8:00 AM - 4:30 PM   Tuesday: 8:00 AM - 4:30 PM   Wednesday: 8:00 AM - 4:30 PM   Thursday: 8:00 AM - 4:30 PM   Friday: 8:00 AM - 4:30 PM   Saturday: CLOSED   Sunday: CLOSED

## 2025-07-23 NOTE — COMMUNITY RESOURCES LIST (ENGLISH)
Food  Food Helpline   Program Provider: [employer]  Program Website : https://www.hungersoFansUniteions.org/programs/[place]-food-helpline/  Next Steps: get more info https://www.hungersolutions.org/programs/mn-food-helpline/how-can-we-help-you/    Program Locations:   Address:  [address] [place] [address]   Distance:  2.53 mi     Hours:   Monday: 8:00 AM - 5:00 PM   Tuesday: 8:00 AM - 5:00 PM   Wednesday: 8:00 AM - 5:00 PM   Thursday: 8:00 AM - 5:00 PM   Friday: 8:00 AM - 5:00 PM   Saturday: CLOSED   Sunday: CLOSED     Reduced Cost Groceries   Program Provider: [employer]  Program Website : [email or web address]/groceries/fare-for-all/  Next Steps: Go to [email or web address]/groceries/fare-for-all/schedule/    Program Locations:   Address:  [address] [place] [address]   Distance:  2.53 mi     Hours:   Monday: 9:00 AM - 5:00 PM   Tuesday: 9:00 AM - 5:00 PM   Wednesday: 9:00 AM - 5:00 PM   Thursday: 9:00 AM - 5:00 PM   Friday: 9:00 AM - 5:00 PM   Saturday: CLOSED   Sunday: CLOSED     Meal Delivery   Program Provider: [employer] [facility]  Program Website : [email or web address]  Program Phone Number: [phone]  Next Steps: Call [phone]    Program Locations:   Address:  [address] [place] [address]   Distance:  7.55 mi   Office Phone Number: [phone]    Hours:   Monday: 8:00 AM - 5:00 PM   Tuesday: 8:00 AM - 5:00 PM   Wednesday: 8:00 AM - 5:00 PM   Thursday: 8:00 AM - 5:00 PM   Friday: 8:00 AM - 5:00 PM   Saturday: CLOSED   Sunday: CLOSED     Supplemental Nutrition Assistance Program (SNAP) Outreach   Program Provider: [facility]  Program Website : https://www.arvest.org/who--how-we-help/services-and-programs/programs/snap-outreach.html#.DmmiYJWZi1s  Program Phone Number: [phone]  Next Steps: Go to  https://www.arvest.org/who--how-we-help/services-and-programs/programs/snap-outreach.html#.PizaTYKCl0u    Program Locations:   Address:  [address] [place] [address]   Distance:  14.54 mi   Office Phone Number: [phone]    Hours:   Monday: 8:00 AM - 4:30 PM   Tuesday: 8:00 AM - 4:30 PM   Wednesday: 8:00 AM - 4:30 PM   Thursday: 8:00 AM - 4:30 PM   Friday: 8:00 AM - 4:30 PM   Saturday: CLOSED   Sunday: CLOSED

## 2025-08-19 ENCOUNTER — PATIENT OUTREACH (OUTPATIENT)
Dept: FAMILY MEDICINE | Facility: CLINIC | Age: 50
End: 2025-08-19
Payer: COMMERCIAL

## (undated) DEVICE — SOL WATER IRRIG 1000ML BOTTLE 07139-09

## (undated) RX ORDER — FENTANYL CITRATE 50 UG/ML
INJECTION, SOLUTION INTRAMUSCULAR; INTRAVENOUS
Status: DISPENSED
Start: 2018-07-16

## (undated) RX ORDER — FENTANYL CITRATE 50 UG/ML
INJECTION, SOLUTION INTRAMUSCULAR; INTRAVENOUS
Status: DISPENSED
Start: 2022-01-13

## (undated) RX ORDER — SIMETHICONE 40MG/0.6ML
SUSPENSION, DROPS(FINAL DOSAGE FORM)(ML) ORAL
Status: DISPENSED
Start: 2022-01-13